# Patient Record
Sex: MALE | Race: WHITE | Employment: FULL TIME | URBAN - METROPOLITAN AREA
[De-identification: names, ages, dates, MRNs, and addresses within clinical notes are randomized per-mention and may not be internally consistent; named-entity substitution may affect disease eponyms.]

---

## 2021-11-02 NOTE — PROGRESS NOTES
Chief Complaint   Patient presents with    Diabetes    New Patient    Medication Refill       Patient was last seen: New Patient Visit  Seen by me at last practice 8/21    General:   Is too long to drive to get here, but having issues and does not see Dr Estrellita Hernandez for month and they would not let him see the NP    Had 2 weeks of very low sugars - 46s  Felt shaky  Having to eat extra meal at night to keep it up  Then stopped happening  No vaccine or Abx  But did put joana on outside of arm  - so may be cause of low readings  B/c once switched arms and back to normal    Daughter in [place]      A1c: last a1c was 7.5     DM Medications:   Metformin 1g BID  Farxiga 10mg every day  glimeperide prn (cereal in AM)    Key Antihyperglycemic Medications             glimepiride (AMARYL) 4 mg tablet (Taking)     dapagliflozin (Farxiga) 5 mg tab tablet (Taking) Take  by mouth daily. metFORMIN (GLUCOPHAGE) 1,000 mg tablet (Taking) Take 1,000 mg by mouth two (2) times daily (with meals).           Last Changes: no recent changes    Sugar Checks: checks more than 4 times a day and uses [email or web address] CGM     AM: see scanned CGM reports     PM: see scanned CGM reports     LOWs:  denies low sugars     DIET: feels does well with diabetic diet     EXERCISE: engages in activity, several times a week     HTN: at goal, on ACE-I     LIPIDS: at goal, on statin, on fenofibrate, on Rx Fish Oil, lipids followed by PCP    RENAL: has normal renal function, has normal JUAN F-r in past year     EYES: eye exam in past year, has no retinopathy     FEET: has no current issues     DENTAL:  has seen dentist in past year     HEART:  no chest pain, shortness of breath or claudication, has no cardiac history, prior studies include:, normal stress test     ASA:  is on blood thinner     SYMPTOMS: no polyuria, thirst or blurred vision     THYROID: no known thyroid issue    DIABETES HISTORY:  Dx summer '09  Stopped Alfaro and GLLP-1 after wt loss and lows early '19  H/o tradjenta -not clear why stopped      LABS/STUDIES:  8/23/21 a1c 7.5, GFR > 60    Current Outpatient Medications   Medication Sig    FreeStyle Bear 14 Day Sensor kit     glimepiride (AMARYL) 4 mg tablet     dapagliflozin (Farxiga) 5 mg tab tablet Take  by mouth daily.  warfarin (COUMADIN) 2.5 mg tablet Take 2.5 mg by mouth daily.  metFORMIN (GLUCOPHAGE) 1,000 mg tablet Take 1,000 mg by mouth two (2) times daily (with meals). No current facility-administered medications for this visit. Past Medical History:   Diagnosis Date    Diabetes ([facility]     Factor 5 Leiden mutation, heterozygous ([facility]     Hypertension         History reviewed. No pertinent surgical history. Social History     Tobacco Use    Smoking status: Never Smoker    Smokeless tobacco: Never Used   Substance Use Topics    Alcohol use: Yes     Alcohol/week: 1.0 standard drink     Types: 1 Glasses of wine per week     Comment: occ      Employer:  [employer]     Family History   Problem Relation Age of Onset    Cancer Mother     Pneumonia Father         Blood pressure 109/62, pulse (!) 54, resp. rate 16, height 6' (1.829 m), weight 229 lb 1.6 oz (103.9 kg), SpO2 98 %. Wt 8/23/12 224    Weight Metrics 11/3/2021 7/8/2016   Weight 229 lb 1.6 oz 228 lb   BMI 31.07 kg/m2 29.11 kg/m2        EXAM      Assessment/Plan:   1. Type 2 diabetes mellitus with hyperglycemia, without long-term current use of insulin (HCC)  Stable until recent 2 weeks of lows - resolved with new sensor -but was having symptoms with the lows so could be real  No clear cause, maybe fighting a virus w/o symptoms  In future, do finger stick to compare (noting 15min time difference)  Can lower metformin to 1/2 BID (or 1/2 farxiga but fell this one less likely to cause lows)    2. Primary hypertension  On meds per PCP    3.  Mixed hyperlipidemia  At goal on meds except high Tg and low HDL        Orders Placed This Encounter    [facility] CONTINUOUS GLUCOSE MONITORING ANALYSIS I&R      Follow-up and Dispositions    · Return if symptoms worsen or fail to improve.      Pt plans to see Dr Bria Nguyen in a few months at [facility]

## 2021-11-03 ENCOUNTER — OFFICE VISIT (OUTPATIENT)
Dept: ENDOCRINOLOGY | Age: 76
End: 2021-11-03
Payer: MEDICARE

## 2021-11-03 VITALS
BODY MASS INDEX: 31.03 KG/M2 | SYSTOLIC BLOOD PRESSURE: 109 MMHG | RESPIRATION RATE: 16 BRPM | DIASTOLIC BLOOD PRESSURE: 62 MMHG | WEIGHT: 229.1 LBS | HEART RATE: 54 BPM | HEIGHT: 72 IN | OXYGEN SATURATION: 98 %

## 2021-11-03 DIAGNOSIS — E11.65 TYPE 2 DIABETES MELLITUS WITH HYPERGLYCEMIA, WITHOUT LONG-TERM CURRENT USE OF INSULIN (HCC): Primary | ICD-10-CM

## 2021-11-03 DIAGNOSIS — E78.2 MIXED HYPERLIPIDEMIA: ICD-10-CM

## 2021-11-03 DIAGNOSIS — I10 PRIMARY HYPERTENSION: ICD-10-CM

## 2021-11-03 PROCEDURE — 95251 CONT GLUC MNTR ANALYSIS I&R: CPT | Performed by: INTERNAL MEDICINE

## 2021-11-03 PROCEDURE — 99214 OFFICE O/P EST MOD 30 MIN: CPT | Performed by: INTERNAL MEDICINE

## 2021-11-03 RX ORDER — FLASH GLUCOSE SENSOR
KIT MISCELLANEOUS
COMMUNITY
Start: 2021-10-30

## 2021-11-03 RX ORDER — GLIMEPIRIDE 4 MG/1
TABLET ORAL
COMMUNITY
Start: 2021-10-07 | End: 2021-11-23

## 2021-11-03 NOTE — LETTER
11/3/2021 Patient: Danielle Temple YOB: 1945 Date of Visit: 11/3/2021 Elizabeth Hollingsworth MD 
[address] [place] [address] [phone] Via Fax: [phone] Dear Elizabeth Hollingsworth MD, Thank you for referring Mr. Marino Vargas to [facility] for evaluation. My notes for this consultation are attached. If you have questions, please do not hesitate to call me. I look forward to following your patient along with you. Sincerely, Shawn Dawkins MD

## 2021-11-23 RX ORDER — GLIMEPIRIDE 4 MG/1
TABLET ORAL
Qty: 60 TABLET | Refills: 6 | Status: SHIPPED | OUTPATIENT
Start: 2021-11-23

## 2023-10-04 ENCOUNTER — HOSPITAL ENCOUNTER (OUTPATIENT)
Facility: HOSPITAL | Age: 78
Discharge: HOME | End: 2023-10-07

## 2023-10-04 VITALS
WEIGHT: 206 LBS | SYSTOLIC BLOOD PRESSURE: 120 MMHG | HEART RATE: 61 BPM | BODY MASS INDEX: 27.9 KG/M2 | HEIGHT: 72 IN | DIASTOLIC BLOOD PRESSURE: 58 MMHG

## 2023-10-04 RX ORDER — ROSUVASTATIN CALCIUM 20 MG/1
TABLET, COATED ORAL
COMMUNITY
Start: 2023-09-06

## 2023-10-04 RX ORDER — OMEGA-3S/DHA/EPA/FISH OIL 1000-1400
CAPSULE,DELAYED RELEASE (ENTERIC COATED) ORAL DAILY
COMMUNITY

## 2023-10-04 RX ORDER — ASPIRIN 81 MG/1
81 TABLET, CHEWABLE ORAL DAILY
COMMUNITY

## 2023-10-04 RX ORDER — RELUGOLIX 120 MG/1
TABLET, FILM COATED ORAL
COMMUNITY
Start: 2023-09-19

## 2023-10-04 RX ORDER — WARFARIN SODIUM 2.5 MG/1
TABLET ORAL
COMMUNITY
Start: 2023-08-22

## 2023-10-04 RX ORDER — ACETAMINOPHEN 160 MG
TABLET,DISINTEGRATING ORAL DAILY
COMMUNITY

## 2023-10-04 RX ORDER — TRANDOLAPRIL TABLETS 2 MG/1
TABLET ORAL
COMMUNITY
Start: 2023-09-06

## 2023-10-04 RX ORDER — ATENOLOL 25 MG/1
TABLET ORAL
COMMUNITY
Start: 2023-09-06

## 2023-10-04 RX ORDER — FENOFIBRIC ACID 135 MG/1
CAPSULE, DELAYED RELEASE ORAL
COMMUNITY
Start: 2023-09-06

## 2023-10-04 RX ORDER — GLIMEPIRIDE 4 MG/1
TABLET ORAL
COMMUNITY
Start: 2023-09-11

## 2023-10-04 RX ORDER — SEMAGLUTIDE 2.68 MG/ML
INJECTION, SOLUTION SUBCUTANEOUS
COMMUNITY
Start: 2023-08-24

## 2023-10-04 RX ORDER — DAPAGLIFLOZIN AND METFORMIN HYDROCHLORIDE 5; 1000 MG/1; MG/1
TABLET, FILM COATED, EXTENDED RELEASE ORAL
COMMUNITY
Start: 2023-08-24

## 2023-10-04 NOTE — CONSULTS
[facility] [place] at [facility]    Radiation Oncology Consultation    Shanika Mcclain III  [id number]  1945     Diagnosis   1. 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 History of Present Illness   Mr. Sarah Lazo is a 70 y.o. male seen in consultation at the request of Dr. Zeus Izquierdo to assess the role of radiation for his above diagnosis. He has a past medical history of hyperlipidemia, hypertension, diabetes mellitus, CAD, and factor V Leyden disease with a history of DVT/PE on Coumadin who was initially referred to urology in early 2023 for an elevated PSA of 7.0 on 3/1/2023. PSA was repeated on 4/21/2023 and was persistently elevated at 7.52. On 6/3/2023 he underwent a multiparametric MRI of the prostate at [facility]. His gland was measured at 54 cc. He was noted to have a 1.4 x 2.1 x 0.7 cm PI-RADS 5 lesion in the right posterior peripheral zone extending from the mid gland to the apex as well as into the left side with areas of extracapsular extension seen. No seminal vesicle invasion, neurovascular bundle involvement, or pelvic lymphadenopathy was noted. No aggressive osseous lesions were noted. On 8/15/2023 he underwent a TRUS guided template core and MR/US fusion guided targeted biopsy of the prostate. His gland was measured at 45 cc. MELANIE was normal.    Final pathology revealed 10/12 template and 1/1 targeted biopsy positive for adenocarcinoma; a total of 11/13 biopsies. Gera 4+3=7 disease was noted in 2 template cores. Cribriform carcinoma and perineural invasion were noted. [place] 3+4=7 disease in the 1 targeted and 4 template biopsies. The remaining biopsies were Gera 6. On 9/14/2023 he underwent a Pylarify PSMA PET/CT at [facility].   This showed tracer binding in the right

## 2023-12-12 ENCOUNTER — HOSPITAL ENCOUNTER (OUTPATIENT)
Age: 78
Discharge: HOME | End: 2023-12-15
Payer: MEDICARE

## 2023-12-12 ENCOUNTER — HOSPITAL ENCOUNTER (OUTPATIENT)
Facility: HOSPITAL | Age: 78
Discharge: HOME | End: 2023-12-15
Attending: RADIOLOGY

## 2023-12-12 DIAGNOSIS — C61 PROSTATE CA (HCC): ICD-10-CM

## 2023-12-12 PROCEDURE — 76498 UNLISTED MR PROCEDURE: CPT

## 2024-01-15 ENCOUNTER — HOSPITAL ENCOUNTER (OUTPATIENT)
Facility: HOSPITAL | Age: 79
Discharge: HOME | End: 2024-01-18
Attending: RADIOLOGY

## 2024-01-15 LAB
RAD ONC ARIA COURSE FIRST TREATMENT DATE: NORMAL
RAD ONC ARIA COURSE ID: NORMAL
RAD ONC ARIA COURSE INTENT: NORMAL
RAD ONC ARIA COURSE LAST TREATMENT DATE: NORMAL
RAD ONC ARIA COURSE SESSION NUMBER: 1
RAD ONC ARIA COURSE START DATE: NORMAL
RAD ONC ARIA COURSE TREATMENT ELAPSED DAYS: 0
RAD ONC ARIA PLAN FRACTIONS TREATED TO DATE: 1
RAD ONC ARIA PLAN ID: NORMAL
RAD ONC ARIA PLAN PRESCRIBED DOSE PER FRACTION: 3 GY
RAD ONC ARIA PLAN PRIMARY REFERENCE POINT: NORMAL
RAD ONC ARIA PLAN TOTAL FRACTIONS PRESCRIBED: 20
RAD ONC ARIA PLAN TOTAL PRESCRIBED DOSE: 6000 CGY
RAD ONC ARIA REFERENCE POINT DOSAGE GIVEN TO DATE: 3 GY
RAD ONC ARIA REFERENCE POINT DOSAGE GIVEN TO DATE: 3.1 GY
RAD ONC ARIA REFERENCE POINT DOSAGE GIVEN TO DATE: 3.35 GY
RAD ONC ARIA REFERENCE POINT DOSAGE GIVEN TO DATE: 3.5 GY
RAD ONC ARIA REFERENCE POINT ID: NORMAL
RAD ONC ARIA REFERENCE POINT SESSION DOSAGE GIVEN: 3 GY
RAD ONC ARIA REFERENCE POINT SESSION DOSAGE GIVEN: 3.1 GY
RAD ONC ARIA REFERENCE POINT SESSION DOSAGE GIVEN: 3.35 GY
RAD ONC ARIA REFERENCE POINT SESSION DOSAGE GIVEN: 3.5 GY

## 2024-01-16 ENCOUNTER — HOSPITAL ENCOUNTER (OUTPATIENT)
Facility: HOSPITAL | Age: 79
Discharge: HOME | End: 2024-01-19
Attending: RADIOLOGY

## 2024-01-16 LAB
RAD ONC ARIA COURSE FIRST TREATMENT DATE: NORMAL
RAD ONC ARIA COURSE ID: NORMAL
RAD ONC ARIA COURSE INTENT: NORMAL
RAD ONC ARIA COURSE LAST TREATMENT DATE: NORMAL
RAD ONC ARIA COURSE SESSION NUMBER: 2
RAD ONC ARIA COURSE START DATE: NORMAL
RAD ONC ARIA COURSE TREATMENT ELAPSED DAYS: 1
RAD ONC ARIA PLAN FRACTIONS TREATED TO DATE: 2
RAD ONC ARIA PLAN ID: NORMAL
RAD ONC ARIA PLAN PRESCRIBED DOSE PER FRACTION: 3 GY
RAD ONC ARIA PLAN PRIMARY REFERENCE POINT: NORMAL
RAD ONC ARIA PLAN TOTAL FRACTIONS PRESCRIBED: 20
RAD ONC ARIA PLAN TOTAL PRESCRIBED DOSE: 6000 CGY
RAD ONC ARIA REFERENCE POINT DOSAGE GIVEN TO DATE: 6 GY
RAD ONC ARIA REFERENCE POINT DOSAGE GIVEN TO DATE: 6.2 GY
RAD ONC ARIA REFERENCE POINT DOSAGE GIVEN TO DATE: 6.7 GY
RAD ONC ARIA REFERENCE POINT DOSAGE GIVEN TO DATE: 7 GY
RAD ONC ARIA REFERENCE POINT ID: NORMAL
RAD ONC ARIA REFERENCE POINT SESSION DOSAGE GIVEN: 3 GY
RAD ONC ARIA REFERENCE POINT SESSION DOSAGE GIVEN: 3.1 GY
RAD ONC ARIA REFERENCE POINT SESSION DOSAGE GIVEN: 3.35 GY
RAD ONC ARIA REFERENCE POINT SESSION DOSAGE GIVEN: 3.5 GY

## 2024-01-17 ENCOUNTER — HOSPITAL ENCOUNTER (OUTPATIENT)
Facility: HOSPITAL | Age: 79
Discharge: HOME | End: 2024-01-20
Attending: RADIOLOGY

## 2024-01-17 LAB
RAD ONC ARIA COURSE FIRST TREATMENT DATE: NORMAL
RAD ONC ARIA COURSE ID: NORMAL
RAD ONC ARIA COURSE INTENT: NORMAL
RAD ONC ARIA COURSE LAST TREATMENT DATE: NORMAL
RAD ONC ARIA COURSE SESSION NUMBER: 3
RAD ONC ARIA COURSE START DATE: NORMAL
RAD ONC ARIA COURSE TREATMENT ELAPSED DAYS: 2
RAD ONC ARIA PLAN FRACTIONS TREATED TO DATE: 3
RAD ONC ARIA PLAN ID: NORMAL
RAD ONC ARIA PLAN PRESCRIBED DOSE PER FRACTION: 3 GY
RAD ONC ARIA PLAN PRIMARY REFERENCE POINT: NORMAL
RAD ONC ARIA PLAN TOTAL FRACTIONS PRESCRIBED: 20
RAD ONC ARIA PLAN TOTAL PRESCRIBED DOSE: 6000 CGY
RAD ONC ARIA REFERENCE POINT DOSAGE GIVEN TO DATE: 10.05 GY
RAD ONC ARIA REFERENCE POINT DOSAGE GIVEN TO DATE: 10.5 GY
RAD ONC ARIA REFERENCE POINT DOSAGE GIVEN TO DATE: 9 GY
RAD ONC ARIA REFERENCE POINT DOSAGE GIVEN TO DATE: 9.3 GY
RAD ONC ARIA REFERENCE POINT ID: NORMAL
RAD ONC ARIA REFERENCE POINT SESSION DOSAGE GIVEN: 3 GY
RAD ONC ARIA REFERENCE POINT SESSION DOSAGE GIVEN: 3.1 GY
RAD ONC ARIA REFERENCE POINT SESSION DOSAGE GIVEN: 3.35 GY
RAD ONC ARIA REFERENCE POINT SESSION DOSAGE GIVEN: 3.5 GY

## 2024-01-18 ENCOUNTER — HOSPITAL ENCOUNTER (OUTPATIENT)
Facility: HOSPITAL | Age: 79
Discharge: HOME | End: 2024-01-21
Attending: RADIOLOGY

## 2024-01-18 LAB
RAD ONC ARIA COURSE FIRST TREATMENT DATE: NORMAL
RAD ONC ARIA COURSE ID: NORMAL
RAD ONC ARIA COURSE INTENT: NORMAL
RAD ONC ARIA COURSE LAST TREATMENT DATE: NORMAL
RAD ONC ARIA COURSE SESSION NUMBER: 4
RAD ONC ARIA COURSE START DATE: NORMAL
RAD ONC ARIA COURSE TREATMENT ELAPSED DAYS: 3
RAD ONC ARIA PLAN FRACTIONS TREATED TO DATE: 4
RAD ONC ARIA PLAN ID: NORMAL
RAD ONC ARIA PLAN PRESCRIBED DOSE PER FRACTION: 3 GY
RAD ONC ARIA PLAN PRIMARY REFERENCE POINT: NORMAL
RAD ONC ARIA PLAN TOTAL FRACTIONS PRESCRIBED: 20
RAD ONC ARIA PLAN TOTAL PRESCRIBED DOSE: 6000 CGY
RAD ONC ARIA REFERENCE POINT DOSAGE GIVEN TO DATE: 12 GY
RAD ONC ARIA REFERENCE POINT DOSAGE GIVEN TO DATE: 12.4 GY
RAD ONC ARIA REFERENCE POINT DOSAGE GIVEN TO DATE: 13.4 GY
RAD ONC ARIA REFERENCE POINT DOSAGE GIVEN TO DATE: 14 GY
RAD ONC ARIA REFERENCE POINT ID: NORMAL
RAD ONC ARIA REFERENCE POINT SESSION DOSAGE GIVEN: 3 GY
RAD ONC ARIA REFERENCE POINT SESSION DOSAGE GIVEN: 3.1 GY
RAD ONC ARIA REFERENCE POINT SESSION DOSAGE GIVEN: 3.35 GY
RAD ONC ARIA REFERENCE POINT SESSION DOSAGE GIVEN: 3.5 GY

## 2024-01-19 ENCOUNTER — HOSPITAL ENCOUNTER (OUTPATIENT)
Facility: HOSPITAL | Age: 79
Discharge: HOME | End: 2024-01-22
Attending: RADIOLOGY

## 2024-01-19 LAB
RAD ONC ARIA COURSE FIRST TREATMENT DATE: NORMAL
RAD ONC ARIA COURSE ID: NORMAL
RAD ONC ARIA COURSE INTENT: NORMAL
RAD ONC ARIA COURSE LAST TREATMENT DATE: NORMAL
RAD ONC ARIA COURSE SESSION NUMBER: 5
RAD ONC ARIA COURSE START DATE: NORMAL
RAD ONC ARIA COURSE TREATMENT ELAPSED DAYS: 4
RAD ONC ARIA PLAN FRACTIONS TREATED TO DATE: 5
RAD ONC ARIA PLAN ID: NORMAL
RAD ONC ARIA PLAN PRESCRIBED DOSE PER FRACTION: 3 GY
RAD ONC ARIA PLAN PRIMARY REFERENCE POINT: NORMAL
RAD ONC ARIA PLAN TOTAL FRACTIONS PRESCRIBED: 20
RAD ONC ARIA PLAN TOTAL PRESCRIBED DOSE: 6000 CGY
RAD ONC ARIA REFERENCE POINT DOSAGE GIVEN TO DATE: 15 GY
RAD ONC ARIA REFERENCE POINT DOSAGE GIVEN TO DATE: 15.5 GY
RAD ONC ARIA REFERENCE POINT DOSAGE GIVEN TO DATE: 16.75 GY
RAD ONC ARIA REFERENCE POINT DOSAGE GIVEN TO DATE: 17.5 GY
RAD ONC ARIA REFERENCE POINT ID: NORMAL
RAD ONC ARIA REFERENCE POINT SESSION DOSAGE GIVEN: 3 GY
RAD ONC ARIA REFERENCE POINT SESSION DOSAGE GIVEN: 3.1 GY
RAD ONC ARIA REFERENCE POINT SESSION DOSAGE GIVEN: 3.35 GY
RAD ONC ARIA REFERENCE POINT SESSION DOSAGE GIVEN: 3.5 GY

## 2024-01-19 ASSESSMENT — PAIN SCALES - GENERAL: PAINLEVEL_OUTOF10: 0

## 2024-01-19 NOTE — PROGRESS NOTES
[facility] [place]  [facility]    Radiation Oncology Weekly Progress Note  Encounter Date: 01/19/24     iRchie Potter III  [id number]  1945     Diagnosis   NCCN high risk prostate adenocarcinoma, clinical T3a (EPE noted on MRI) N0 M0, initial PSA 7.08, [place] 4+3=7 (2 out of total 11/13 biopsies +, + cribriform pattern, + PNI)       AJCC Staging has been reviewed.    Interval History   Mr. Potter is a 78 y.o. male seen today for his weekly on-treatment evaluation    1/19/2024: Doing very well thus far after 5 fractions.  No  or GI complaints.  Having hot flashes.  Informed me that his Orgovyx was over $1000 per month and thus we will switch to Lupron.  Discussed a total of 2 years of ADT.    Review of Systems:  A complete review of systems was obtained and negative except as described above.    Treatment Details:     Treatment Site Dose/Fx (cGy) #Fx Current Dose (cGy) Total Planned Dose (cGy) Start Date Most Recent Treatment Date   Prostate 300  5/20 1500 6000 with SIB to MRI disease 1/15/24 01/19/24     Concurrent Therapy: Concurrent ADT:      Allergies and Medications     Allergies   Allergen Reactions    Sulfa Antibiotics        Current Outpatient Medications   Medication Instructions    Apoaequorin (PREVAGEN EXTRA STRENGTH PO) 1 tablet, Oral, DAILY    aspirin 81 mg, Oral, DAILY    atenolol (TENORMIN) 25 MG tablet No dose, route, or frequency recorded.    Cholecalciferol (VITAMIN D3) 50 MCG (2000 UT) CAPS Oral, DAILY    fenofibric acid (TRILIPIX) 135 MG CPDR capsule No dose, route, or frequency recorded.    glimepiride (AMARYL) 4 MG tablet No dose, route, or frequency recorded.    Multiple Vitamins-Minerals (MULTIVITAMIN ADULTS PO) Oral, DAILY    Omega-3 Fatty Acids (FISH OIL ULTRA) 1400 MG CAPS Oral, DAILY    ORGOVYX 120 MG TABS No dose, route, or frequency recorded.    OZEMPIC, 2 MG/DOSE, 8 MG/3ML SOPN No dose, route, or frequency recorded.    RESVERATROL  mg, Oral, DAILY

## 2024-01-22 ENCOUNTER — HOSPITAL ENCOUNTER (OUTPATIENT)
Facility: HOSPITAL | Age: 79
Discharge: HOME | End: 2024-01-25
Attending: RADIOLOGY

## 2024-01-22 LAB
RAD ONC ARIA COURSE FIRST TREATMENT DATE: NORMAL
RAD ONC ARIA COURSE ID: NORMAL
RAD ONC ARIA COURSE INTENT: NORMAL
RAD ONC ARIA COURSE LAST TREATMENT DATE: NORMAL
RAD ONC ARIA COURSE SESSION NUMBER: 6
RAD ONC ARIA COURSE START DATE: NORMAL
RAD ONC ARIA COURSE TREATMENT ELAPSED DAYS: 7
RAD ONC ARIA PLAN FRACTIONS TREATED TO DATE: 6
RAD ONC ARIA PLAN ID: NORMAL
RAD ONC ARIA PLAN PRESCRIBED DOSE PER FRACTION: 3 GY
RAD ONC ARIA PLAN PRIMARY REFERENCE POINT: NORMAL
RAD ONC ARIA PLAN TOTAL FRACTIONS PRESCRIBED: 20
RAD ONC ARIA PLAN TOTAL PRESCRIBED DOSE: 6000 CGY
RAD ONC ARIA REFERENCE POINT DOSAGE GIVEN TO DATE: 18 GY
RAD ONC ARIA REFERENCE POINT DOSAGE GIVEN TO DATE: 18.6 GY
RAD ONC ARIA REFERENCE POINT DOSAGE GIVEN TO DATE: 20.1 GY
RAD ONC ARIA REFERENCE POINT DOSAGE GIVEN TO DATE: 21 GY
RAD ONC ARIA REFERENCE POINT ID: NORMAL
RAD ONC ARIA REFERENCE POINT SESSION DOSAGE GIVEN: 3 GY
RAD ONC ARIA REFERENCE POINT SESSION DOSAGE GIVEN: 3.1 GY
RAD ONC ARIA REFERENCE POINT SESSION DOSAGE GIVEN: 3.35 GY
RAD ONC ARIA REFERENCE POINT SESSION DOSAGE GIVEN: 3.5 GY

## 2024-01-23 ENCOUNTER — HOSPITAL ENCOUNTER (OUTPATIENT)
Facility: HOSPITAL | Age: 79
Discharge: HOME | End: 2024-01-26
Attending: RADIOLOGY

## 2024-01-23 LAB
RAD ONC ARIA COURSE FIRST TREATMENT DATE: NORMAL
RAD ONC ARIA COURSE ID: NORMAL
RAD ONC ARIA COURSE INTENT: NORMAL
RAD ONC ARIA COURSE LAST TREATMENT DATE: NORMAL
RAD ONC ARIA COURSE SESSION NUMBER: 7
RAD ONC ARIA COURSE START DATE: NORMAL
RAD ONC ARIA COURSE TREATMENT ELAPSED DAYS: 8
RAD ONC ARIA PLAN FRACTIONS TREATED TO DATE: 7
RAD ONC ARIA PLAN ID: NORMAL
RAD ONC ARIA PLAN PRESCRIBED DOSE PER FRACTION: 3 GY
RAD ONC ARIA PLAN PRIMARY REFERENCE POINT: NORMAL
RAD ONC ARIA PLAN TOTAL FRACTIONS PRESCRIBED: 20
RAD ONC ARIA PLAN TOTAL PRESCRIBED DOSE: 6000 CGY
RAD ONC ARIA REFERENCE POINT DOSAGE GIVEN TO DATE: 21 GY
RAD ONC ARIA REFERENCE POINT DOSAGE GIVEN TO DATE: 21.7 GY
RAD ONC ARIA REFERENCE POINT DOSAGE GIVEN TO DATE: 23.45 GY
RAD ONC ARIA REFERENCE POINT DOSAGE GIVEN TO DATE: 24.5 GY
RAD ONC ARIA REFERENCE POINT ID: NORMAL
RAD ONC ARIA REFERENCE POINT SESSION DOSAGE GIVEN: 3 GY
RAD ONC ARIA REFERENCE POINT SESSION DOSAGE GIVEN: 3.1 GY
RAD ONC ARIA REFERENCE POINT SESSION DOSAGE GIVEN: 3.35 GY
RAD ONC ARIA REFERENCE POINT SESSION DOSAGE GIVEN: 3.5 GY

## 2024-01-24 ENCOUNTER — HOSPITAL ENCOUNTER (OUTPATIENT)
Facility: HOSPITAL | Age: 79
Discharge: HOME | End: 2024-01-27
Attending: RADIOLOGY

## 2024-01-24 LAB
RAD ONC ARIA COURSE FIRST TREATMENT DATE: NORMAL
RAD ONC ARIA COURSE ID: NORMAL
RAD ONC ARIA COURSE INTENT: NORMAL
RAD ONC ARIA COURSE LAST TREATMENT DATE: NORMAL
RAD ONC ARIA COURSE SESSION NUMBER: 8
RAD ONC ARIA COURSE START DATE: NORMAL
RAD ONC ARIA COURSE TREATMENT ELAPSED DAYS: 9
RAD ONC ARIA PLAN FRACTIONS TREATED TO DATE: 8
RAD ONC ARIA PLAN ID: NORMAL
RAD ONC ARIA PLAN PRESCRIBED DOSE PER FRACTION: 3 GY
RAD ONC ARIA PLAN PRIMARY REFERENCE POINT: NORMAL
RAD ONC ARIA PLAN TOTAL FRACTIONS PRESCRIBED: 20
RAD ONC ARIA PLAN TOTAL PRESCRIBED DOSE: 6000 CGY
RAD ONC ARIA REFERENCE POINT DOSAGE GIVEN TO DATE: 24 GY
RAD ONC ARIA REFERENCE POINT DOSAGE GIVEN TO DATE: 24.8 GY
RAD ONC ARIA REFERENCE POINT DOSAGE GIVEN TO DATE: 26.8 GY
RAD ONC ARIA REFERENCE POINT DOSAGE GIVEN TO DATE: 28 GY
RAD ONC ARIA REFERENCE POINT ID: NORMAL
RAD ONC ARIA REFERENCE POINT SESSION DOSAGE GIVEN: 3 GY
RAD ONC ARIA REFERENCE POINT SESSION DOSAGE GIVEN: 3.1 GY
RAD ONC ARIA REFERENCE POINT SESSION DOSAGE GIVEN: 3.35 GY
RAD ONC ARIA REFERENCE POINT SESSION DOSAGE GIVEN: 3.5 GY

## 2024-01-25 ENCOUNTER — HOSPITAL ENCOUNTER (OUTPATIENT)
Facility: HOSPITAL | Age: 79
Discharge: HOME | End: 2024-01-28
Attending: RADIOLOGY

## 2024-01-25 LAB
RAD ONC ARIA COURSE FIRST TREATMENT DATE: NORMAL
RAD ONC ARIA COURSE ID: NORMAL
RAD ONC ARIA COURSE INTENT: NORMAL
RAD ONC ARIA COURSE LAST TREATMENT DATE: NORMAL
RAD ONC ARIA COURSE SESSION NUMBER: 9
RAD ONC ARIA COURSE START DATE: NORMAL
RAD ONC ARIA COURSE TREATMENT ELAPSED DAYS: 10
RAD ONC ARIA PLAN FRACTIONS TREATED TO DATE: 9
RAD ONC ARIA PLAN ID: NORMAL
RAD ONC ARIA PLAN PRESCRIBED DOSE PER FRACTION: 3 GY
RAD ONC ARIA PLAN PRIMARY REFERENCE POINT: NORMAL
RAD ONC ARIA PLAN TOTAL FRACTIONS PRESCRIBED: 20
RAD ONC ARIA PLAN TOTAL PRESCRIBED DOSE: 6000 CGY
RAD ONC ARIA REFERENCE POINT DOSAGE GIVEN TO DATE: 27 GY
RAD ONC ARIA REFERENCE POINT DOSAGE GIVEN TO DATE: 27.9 GY
RAD ONC ARIA REFERENCE POINT DOSAGE GIVEN TO DATE: 30.15 GY
RAD ONC ARIA REFERENCE POINT DOSAGE GIVEN TO DATE: 31.5 GY
RAD ONC ARIA REFERENCE POINT ID: NORMAL
RAD ONC ARIA REFERENCE POINT SESSION DOSAGE GIVEN: 3 GY
RAD ONC ARIA REFERENCE POINT SESSION DOSAGE GIVEN: 3.1 GY
RAD ONC ARIA REFERENCE POINT SESSION DOSAGE GIVEN: 3.35 GY
RAD ONC ARIA REFERENCE POINT SESSION DOSAGE GIVEN: 3.5 GY

## 2024-01-26 ENCOUNTER — HOSPITAL ENCOUNTER (OUTPATIENT)
Facility: HOSPITAL | Age: 79
Discharge: HOME | End: 2024-01-29
Attending: RADIOLOGY

## 2024-01-26 LAB
RAD ONC ARIA COURSE FIRST TREATMENT DATE: NORMAL
RAD ONC ARIA COURSE ID: NORMAL
RAD ONC ARIA COURSE INTENT: NORMAL
RAD ONC ARIA COURSE LAST TREATMENT DATE: NORMAL
RAD ONC ARIA COURSE SESSION NUMBER: 10
RAD ONC ARIA COURSE START DATE: NORMAL
RAD ONC ARIA COURSE TREATMENT ELAPSED DAYS: 11
RAD ONC ARIA PLAN FRACTIONS TREATED TO DATE: 10
RAD ONC ARIA PLAN ID: NORMAL
RAD ONC ARIA PLAN PRESCRIBED DOSE PER FRACTION: 3 GY
RAD ONC ARIA PLAN PRIMARY REFERENCE POINT: NORMAL
RAD ONC ARIA PLAN TOTAL FRACTIONS PRESCRIBED: 20
RAD ONC ARIA PLAN TOTAL PRESCRIBED DOSE: 6000 CGY
RAD ONC ARIA REFERENCE POINT DOSAGE GIVEN TO DATE: 30 GY
RAD ONC ARIA REFERENCE POINT DOSAGE GIVEN TO DATE: 31 GY
RAD ONC ARIA REFERENCE POINT DOSAGE GIVEN TO DATE: 33.5 GY
RAD ONC ARIA REFERENCE POINT DOSAGE GIVEN TO DATE: 35 GY
RAD ONC ARIA REFERENCE POINT ID: NORMAL
RAD ONC ARIA REFERENCE POINT SESSION DOSAGE GIVEN: 3 GY
RAD ONC ARIA REFERENCE POINT SESSION DOSAGE GIVEN: 3.1 GY
RAD ONC ARIA REFERENCE POINT SESSION DOSAGE GIVEN: 3.35 GY
RAD ONC ARIA REFERENCE POINT SESSION DOSAGE GIVEN: 3.5 GY

## 2024-01-26 ASSESSMENT — PAIN SCALES - GENERAL: PAINLEVEL_OUTOF10: 0

## 2024-01-26 NOTE — PROGRESS NOTES
[facility] [place]  [facility]    Radiation Oncology Weekly Progress Note  Encounter Date: 01/26/24     Richie Potter III  [id number]  1945     Diagnosis   NCCN high risk prostate adenocarcinoma, clinical T3a (EPE noted on MRI) N0 M0, initial PSA 7.08, [place] 4+3=7 (2 out of total 11/13 biopsies +, + cribriform pattern, + PNI)       AJCC Staging has been reviewed.    Interval History   Mr. Potter is a 78 y.o. male seen today for his weekly on-treatment evaluation    1/19/2024: Doing very well thus far after 5 fractions.  No  or GI complaints.  Having hot flashes.  Informed me that his Orgovyx was over $1000 per month and thus we will switch to Lupron.  Discussed a total of 2 years of ADT.  1/26/2024: Flomax was prescribed last week to help with urination and has significantly improved his urinary symptoms.  Stream and nocturia as well as slight dysuria have all improved.  No dizziness.  No GI complaints or changes to bowel habits.    Review of Systems:  A complete review of systems was obtained and negative except as described above.    Treatment Details:     Treatment Site Dose/Fx (cGy) #Fx Current Dose (cGy) Total Planned Dose (cGy) Start Date Most Recent Treatment Date   Prostate 300  10/20 3100 6000 with SIB to MRI disease 1/15/24 01/26/24     Concurrent Therapy: Concurrent ADT:      Allergies and Medications     Allergies   Allergen Reactions    Sulfa Antibiotics        Current Outpatient Medications   Medication Instructions    Apoaequorin (PREVAGEN EXTRA STRENGTH PO) 1 tablet, Oral, DAILY    aspirin 81 mg, Oral, DAILY    atenolol (TENORMIN) 25 MG tablet No dose, route, or frequency recorded.    Cholecalciferol (VITAMIN D3) 50 MCG (2000 UT) CAPS Oral, DAILY    fenofibric acid (TRILIPIX) 135 MG CPDR capsule No dose, route, or frequency recorded.    glimepiride (AMARYL) 4 MG tablet No dose, route, or frequency recorded.    Multiple Vitamins-Minerals (MULTIVITAMIN ADULTS PO) Oral, DAILY

## 2024-01-29 ENCOUNTER — HOSPITAL ENCOUNTER (OUTPATIENT)
Facility: HOSPITAL | Age: 79
Discharge: HOME | End: 2024-02-01
Attending: RADIOLOGY

## 2024-01-29 LAB
RAD ONC ARIA COURSE FIRST TREATMENT DATE: NORMAL
RAD ONC ARIA COURSE ID: NORMAL
RAD ONC ARIA COURSE INTENT: NORMAL
RAD ONC ARIA COURSE LAST TREATMENT DATE: NORMAL
RAD ONC ARIA COURSE SESSION NUMBER: 11
RAD ONC ARIA COURSE START DATE: NORMAL
RAD ONC ARIA COURSE TREATMENT ELAPSED DAYS: 14
RAD ONC ARIA PLAN FRACTIONS TREATED TO DATE: 11
RAD ONC ARIA PLAN ID: NORMAL
RAD ONC ARIA PLAN PRESCRIBED DOSE PER FRACTION: 3 GY
RAD ONC ARIA PLAN PRIMARY REFERENCE POINT: NORMAL
RAD ONC ARIA PLAN TOTAL FRACTIONS PRESCRIBED: 20
RAD ONC ARIA PLAN TOTAL PRESCRIBED DOSE: 6000 CGY
RAD ONC ARIA REFERENCE POINT DOSAGE GIVEN TO DATE: 33 GY
RAD ONC ARIA REFERENCE POINT DOSAGE GIVEN TO DATE: 34.1 GY
RAD ONC ARIA REFERENCE POINT DOSAGE GIVEN TO DATE: 36.84 GY
RAD ONC ARIA REFERENCE POINT DOSAGE GIVEN TO DATE: 38.5 GY
RAD ONC ARIA REFERENCE POINT ID: NORMAL
RAD ONC ARIA REFERENCE POINT SESSION DOSAGE GIVEN: 3 GY
RAD ONC ARIA REFERENCE POINT SESSION DOSAGE GIVEN: 3.1 GY
RAD ONC ARIA REFERENCE POINT SESSION DOSAGE GIVEN: 3.35 GY
RAD ONC ARIA REFERENCE POINT SESSION DOSAGE GIVEN: 3.5 GY

## 2024-01-30 ENCOUNTER — HOSPITAL ENCOUNTER (OUTPATIENT)
Facility: HOSPITAL | Age: 79
Discharge: HOME | End: 2024-02-02
Attending: RADIOLOGY

## 2024-01-30 LAB
RAD ONC ARIA COURSE FIRST TREATMENT DATE: NORMAL
RAD ONC ARIA COURSE ID: NORMAL
RAD ONC ARIA COURSE INTENT: NORMAL
RAD ONC ARIA COURSE LAST TREATMENT DATE: NORMAL
RAD ONC ARIA COURSE SESSION NUMBER: 12
RAD ONC ARIA COURSE START DATE: NORMAL
RAD ONC ARIA COURSE TREATMENT ELAPSED DAYS: 15
RAD ONC ARIA PLAN FRACTIONS TREATED TO DATE: 12
RAD ONC ARIA PLAN ID: NORMAL
RAD ONC ARIA PLAN PRESCRIBED DOSE PER FRACTION: 3 GY
RAD ONC ARIA PLAN PRIMARY REFERENCE POINT: NORMAL
RAD ONC ARIA PLAN TOTAL FRACTIONS PRESCRIBED: 20
RAD ONC ARIA PLAN TOTAL PRESCRIBED DOSE: 6000 CGY
RAD ONC ARIA REFERENCE POINT DOSAGE GIVEN TO DATE: 36 GY
RAD ONC ARIA REFERENCE POINT DOSAGE GIVEN TO DATE: 37.2 GY
RAD ONC ARIA REFERENCE POINT DOSAGE GIVEN TO DATE: 40.19 GY
RAD ONC ARIA REFERENCE POINT DOSAGE GIVEN TO DATE: 42 GY
RAD ONC ARIA REFERENCE POINT ID: NORMAL
RAD ONC ARIA REFERENCE POINT SESSION DOSAGE GIVEN: 3 GY
RAD ONC ARIA REFERENCE POINT SESSION DOSAGE GIVEN: 3.1 GY
RAD ONC ARIA REFERENCE POINT SESSION DOSAGE GIVEN: 3.35 GY
RAD ONC ARIA REFERENCE POINT SESSION DOSAGE GIVEN: 3.5 GY

## 2024-01-31 ENCOUNTER — HOSPITAL ENCOUNTER (OUTPATIENT)
Facility: HOSPITAL | Age: 79
Discharge: HOME | End: 2024-02-03
Attending: RADIOLOGY

## 2024-01-31 LAB
RAD ONC ARIA COURSE FIRST TREATMENT DATE: NORMAL
RAD ONC ARIA COURSE ID: NORMAL
RAD ONC ARIA COURSE INTENT: NORMAL
RAD ONC ARIA COURSE LAST TREATMENT DATE: NORMAL
RAD ONC ARIA COURSE SESSION NUMBER: 13
RAD ONC ARIA COURSE START DATE: NORMAL
RAD ONC ARIA COURSE TREATMENT ELAPSED DAYS: 16
RAD ONC ARIA PLAN FRACTIONS TREATED TO DATE: 13
RAD ONC ARIA PLAN ID: NORMAL
RAD ONC ARIA PLAN PRESCRIBED DOSE PER FRACTION: 3 GY
RAD ONC ARIA PLAN PRIMARY REFERENCE POINT: NORMAL
RAD ONC ARIA PLAN TOTAL FRACTIONS PRESCRIBED: 20
RAD ONC ARIA PLAN TOTAL PRESCRIBED DOSE: 6000 CGY
RAD ONC ARIA REFERENCE POINT DOSAGE GIVEN TO DATE: 39 GY
RAD ONC ARIA REFERENCE POINT DOSAGE GIVEN TO DATE: 40.3 GY
RAD ONC ARIA REFERENCE POINT DOSAGE GIVEN TO DATE: 43.54 GY
RAD ONC ARIA REFERENCE POINT DOSAGE GIVEN TO DATE: 45.5 GY
RAD ONC ARIA REFERENCE POINT ID: NORMAL
RAD ONC ARIA REFERENCE POINT SESSION DOSAGE GIVEN: 3 GY
RAD ONC ARIA REFERENCE POINT SESSION DOSAGE GIVEN: 3.1 GY
RAD ONC ARIA REFERENCE POINT SESSION DOSAGE GIVEN: 3.35 GY
RAD ONC ARIA REFERENCE POINT SESSION DOSAGE GIVEN: 3.5 GY

## 2024-02-01 ENCOUNTER — HOSPITAL ENCOUNTER (OUTPATIENT)
Facility: HOSPITAL | Age: 79
Discharge: HOME | End: 2024-02-04
Attending: RADIOLOGY

## 2024-02-01 LAB
RAD ONC ARIA COURSE FIRST TREATMENT DATE: NORMAL
RAD ONC ARIA COURSE ID: NORMAL
RAD ONC ARIA COURSE INTENT: NORMAL
RAD ONC ARIA COURSE LAST TREATMENT DATE: NORMAL
RAD ONC ARIA COURSE SESSION NUMBER: 14
RAD ONC ARIA COURSE START DATE: NORMAL
RAD ONC ARIA COURSE TREATMENT ELAPSED DAYS: 17
RAD ONC ARIA PLAN FRACTIONS TREATED TO DATE: 14
RAD ONC ARIA PLAN ID: NORMAL
RAD ONC ARIA PLAN PRESCRIBED DOSE PER FRACTION: 3 GY
RAD ONC ARIA PLAN PRIMARY REFERENCE POINT: NORMAL
RAD ONC ARIA PLAN TOTAL FRACTIONS PRESCRIBED: 20
RAD ONC ARIA PLAN TOTAL PRESCRIBED DOSE: 6000 CGY
RAD ONC ARIA REFERENCE POINT DOSAGE GIVEN TO DATE: 42 GY
RAD ONC ARIA REFERENCE POINT DOSAGE GIVEN TO DATE: 43.4 GY
RAD ONC ARIA REFERENCE POINT DOSAGE GIVEN TO DATE: 46.89 GY
RAD ONC ARIA REFERENCE POINT DOSAGE GIVEN TO DATE: 49 GY
RAD ONC ARIA REFERENCE POINT ID: NORMAL
RAD ONC ARIA REFERENCE POINT SESSION DOSAGE GIVEN: 3 GY
RAD ONC ARIA REFERENCE POINT SESSION DOSAGE GIVEN: 3.1 GY
RAD ONC ARIA REFERENCE POINT SESSION DOSAGE GIVEN: 3.35 GY
RAD ONC ARIA REFERENCE POINT SESSION DOSAGE GIVEN: 3.5 GY

## 2024-02-02 ENCOUNTER — HOSPITAL ENCOUNTER (OUTPATIENT)
Facility: HOSPITAL | Age: 79
Discharge: HOME | End: 2024-02-05
Attending: RADIOLOGY

## 2024-02-02 DIAGNOSIS — C61 PROSTATE CANCER (HCC): Primary | ICD-10-CM

## 2024-02-02 LAB
RAD ONC ARIA COURSE FIRST TREATMENT DATE: NORMAL
RAD ONC ARIA COURSE ID: NORMAL
RAD ONC ARIA COURSE INTENT: NORMAL
RAD ONC ARIA COURSE LAST TREATMENT DATE: NORMAL
RAD ONC ARIA COURSE SESSION NUMBER: 15
RAD ONC ARIA COURSE START DATE: NORMAL
RAD ONC ARIA COURSE TREATMENT ELAPSED DAYS: 18
RAD ONC ARIA PLAN FRACTIONS TREATED TO DATE: 15
RAD ONC ARIA PLAN ID: NORMAL
RAD ONC ARIA PLAN PRESCRIBED DOSE PER FRACTION: 3 GY
RAD ONC ARIA PLAN PRIMARY REFERENCE POINT: NORMAL
RAD ONC ARIA PLAN TOTAL FRACTIONS PRESCRIBED: 20
RAD ONC ARIA PLAN TOTAL PRESCRIBED DOSE: 6000 CGY
RAD ONC ARIA REFERENCE POINT DOSAGE GIVEN TO DATE: 45 GY
RAD ONC ARIA REFERENCE POINT DOSAGE GIVEN TO DATE: 46.5 GY
RAD ONC ARIA REFERENCE POINT DOSAGE GIVEN TO DATE: 50.24 GY
RAD ONC ARIA REFERENCE POINT DOSAGE GIVEN TO DATE: 52.5 GY
RAD ONC ARIA REFERENCE POINT ID: NORMAL
RAD ONC ARIA REFERENCE POINT SESSION DOSAGE GIVEN: 3 GY
RAD ONC ARIA REFERENCE POINT SESSION DOSAGE GIVEN: 3.1 GY
RAD ONC ARIA REFERENCE POINT SESSION DOSAGE GIVEN: 3.35 GY
RAD ONC ARIA REFERENCE POINT SESSION DOSAGE GIVEN: 3.5 GY

## 2024-02-02 ASSESSMENT — PATIENT HEALTH QUESTIONNAIRE - PHQ9
SUM OF ALL RESPONSES TO PHQ QUESTIONS 1-9: 0
2. FEELING DOWN, DEPRESSED OR HOPELESS: 0
SUM OF ALL RESPONSES TO PHQ QUESTIONS 1-9: 0
1. LITTLE INTEREST OR PLEASURE IN DOING THINGS: 0
SUM OF ALL RESPONSES TO PHQ9 QUESTIONS 1 & 2: 0
SUM OF ALL RESPONSES TO PHQ QUESTIONS 1-9: 0
SUM OF ALL RESPONSES TO PHQ QUESTIONS 1-9: 0

## 2024-02-02 ASSESSMENT — PAIN SCALES - GENERAL: PAINLEVEL_OUTOF10: 0

## 2024-02-02 NOTE — PROGRESS NOTES
[facility] [place]  [facility]    Radiation Oncology Weekly Progress Note  Encounter Date: 02/02/24     Richie Potter III  [id number]  1945     Diagnosis   NCCN high risk prostate adenocarcinoma, clinical T3a (EPE noted on MRI) N0 M0, initial PSA 7.08, [place] 4+3=7 (2 out of total 11/13 biopsies +, + cribriform pattern, + PNI)     AJCC Staging has been reviewed.    Interval History   Mr. Potter is a 78 y.o. male seen today for his weekly on-treatment evaluation    1/19/2024: Doing very well thus far after 5 fractions.  No  or GI complaints.  Having hot flashes.  Informed me that his Orgovyx was over $1000 per month and thus we will switch to Lupron.  Discussed a total of 2 years of ADT.  1/26/2024: Flomax was prescribed last week to help with urination and has significantly improved his urinary symptoms.  Stream and nocturia as well as slight dysuria have all improved.  No dizziness.  No GI complaints or changes to bowel habits.  2/02/2024: Doing well today, only complaint is some flatulence but no diarrhea.  symptoms stable.    Review of Systems:  A complete review of systems was obtained and negative except as described above.    Treatment Details:     Treatment Site Dose/Fx (cGy) #Fx Current Dose (cGy) Total Planned Dose (cGy) Start Date Most Recent Treatment Date   Prostate 300  15/20 4500 6000 with SIB to MRI disease 1/15/24 02/02/24     Concurrent Therapy: Concurrent ADT:      Allergies and Medications     Allergies   Allergen Reactions    Sulfa Antibiotics        Current Outpatient Medications   Medication Instructions    Apoaequorin (PREVAGEN EXTRA STRENGTH PO) 1 tablet, Oral, DAILY    aspirin 81 mg, Oral, DAILY    atenolol (TENORMIN) 25 MG tablet No dose, route, or frequency recorded.    Cholecalciferol (VITAMIN D3) 50 MCG (2000 UT) CAPS Oral, DAILY    fenofibric acid (TRILIPIX) 135 MG CPDR capsule No dose, route, or frequency recorded.    glimepiride (AMARYL) 4 MG tablet No dose,

## 2024-02-05 ENCOUNTER — HOSPITAL ENCOUNTER (OUTPATIENT)
Facility: HOSPITAL | Age: 79
Discharge: HOME | End: 2024-02-08
Attending: RADIOLOGY

## 2024-02-05 LAB
RAD ONC ARIA COURSE FIRST TREATMENT DATE: NORMAL
RAD ONC ARIA COURSE ID: NORMAL
RAD ONC ARIA COURSE INTENT: NORMAL
RAD ONC ARIA COURSE LAST TREATMENT DATE: NORMAL
RAD ONC ARIA COURSE SESSION NUMBER: 16
RAD ONC ARIA COURSE START DATE: NORMAL
RAD ONC ARIA COURSE TREATMENT ELAPSED DAYS: 21
RAD ONC ARIA PLAN FRACTIONS TREATED TO DATE: 16
RAD ONC ARIA PLAN ID: NORMAL
RAD ONC ARIA PLAN PRESCRIBED DOSE PER FRACTION: 3 GY
RAD ONC ARIA PLAN PRIMARY REFERENCE POINT: NORMAL
RAD ONC ARIA PLAN TOTAL FRACTIONS PRESCRIBED: 20
RAD ONC ARIA PLAN TOTAL PRESCRIBED DOSE: 6000 CGY
RAD ONC ARIA REFERENCE POINT DOSAGE GIVEN TO DATE: 48 GY
RAD ONC ARIA REFERENCE POINT DOSAGE GIVEN TO DATE: 49.6 GY
RAD ONC ARIA REFERENCE POINT DOSAGE GIVEN TO DATE: 53.59 GY
RAD ONC ARIA REFERENCE POINT DOSAGE GIVEN TO DATE: 56 GY
RAD ONC ARIA REFERENCE POINT ID: NORMAL
RAD ONC ARIA REFERENCE POINT SESSION DOSAGE GIVEN: 3 GY
RAD ONC ARIA REFERENCE POINT SESSION DOSAGE GIVEN: 3.1 GY
RAD ONC ARIA REFERENCE POINT SESSION DOSAGE GIVEN: 3.35 GY
RAD ONC ARIA REFERENCE POINT SESSION DOSAGE GIVEN: 3.5 GY

## 2024-02-06 ENCOUNTER — HOSPITAL ENCOUNTER (OUTPATIENT)
Facility: HOSPITAL | Age: 79
Discharge: HOME | End: 2024-02-09
Attending: RADIOLOGY

## 2024-02-06 LAB
RAD ONC ARIA COURSE FIRST TREATMENT DATE: NORMAL
RAD ONC ARIA COURSE ID: NORMAL
RAD ONC ARIA COURSE INTENT: NORMAL
RAD ONC ARIA COURSE LAST TREATMENT DATE: NORMAL
RAD ONC ARIA COURSE SESSION NUMBER: 17
RAD ONC ARIA COURSE START DATE: NORMAL
RAD ONC ARIA COURSE TREATMENT ELAPSED DAYS: 22
RAD ONC ARIA PLAN FRACTIONS TREATED TO DATE: 17
RAD ONC ARIA PLAN ID: NORMAL
RAD ONC ARIA PLAN PRESCRIBED DOSE PER FRACTION: 3 GY
RAD ONC ARIA PLAN PRIMARY REFERENCE POINT: NORMAL
RAD ONC ARIA PLAN TOTAL FRACTIONS PRESCRIBED: 20
RAD ONC ARIA PLAN TOTAL PRESCRIBED DOSE: 6000 CGY
RAD ONC ARIA REFERENCE POINT DOSAGE GIVEN TO DATE: 51 GY
RAD ONC ARIA REFERENCE POINT DOSAGE GIVEN TO DATE: 52.7 GY
RAD ONC ARIA REFERENCE POINT DOSAGE GIVEN TO DATE: 56.94 GY
RAD ONC ARIA REFERENCE POINT DOSAGE GIVEN TO DATE: 59.5 GY
RAD ONC ARIA REFERENCE POINT ID: NORMAL
RAD ONC ARIA REFERENCE POINT SESSION DOSAGE GIVEN: 3 GY
RAD ONC ARIA REFERENCE POINT SESSION DOSAGE GIVEN: 3.1 GY
RAD ONC ARIA REFERENCE POINT SESSION DOSAGE GIVEN: 3.35 GY
RAD ONC ARIA REFERENCE POINT SESSION DOSAGE GIVEN: 3.5 GY

## 2024-02-07 ENCOUNTER — HOSPITAL ENCOUNTER (OUTPATIENT)
Facility: HOSPITAL | Age: 79
Discharge: HOME | End: 2024-02-10
Attending: RADIOLOGY

## 2024-02-07 LAB
RAD ONC ARIA COURSE FIRST TREATMENT DATE: NORMAL
RAD ONC ARIA COURSE ID: NORMAL
RAD ONC ARIA COURSE INTENT: NORMAL
RAD ONC ARIA COURSE LAST TREATMENT DATE: NORMAL
RAD ONC ARIA COURSE SESSION NUMBER: 18
RAD ONC ARIA COURSE START DATE: NORMAL
RAD ONC ARIA COURSE TREATMENT ELAPSED DAYS: 23
RAD ONC ARIA PLAN FRACTIONS TREATED TO DATE: 18
RAD ONC ARIA PLAN ID: NORMAL
RAD ONC ARIA PLAN PRESCRIBED DOSE PER FRACTION: 3 GY
RAD ONC ARIA PLAN PRIMARY REFERENCE POINT: NORMAL
RAD ONC ARIA PLAN TOTAL FRACTIONS PRESCRIBED: 20
RAD ONC ARIA PLAN TOTAL PRESCRIBED DOSE: 6000 CGY
RAD ONC ARIA REFERENCE POINT DOSAGE GIVEN TO DATE: 54 GY
RAD ONC ARIA REFERENCE POINT DOSAGE GIVEN TO DATE: 55.8 GY
RAD ONC ARIA REFERENCE POINT DOSAGE GIVEN TO DATE: 60.29 GY
RAD ONC ARIA REFERENCE POINT DOSAGE GIVEN TO DATE: 63 GY
RAD ONC ARIA REFERENCE POINT ID: NORMAL
RAD ONC ARIA REFERENCE POINT SESSION DOSAGE GIVEN: 3 GY
RAD ONC ARIA REFERENCE POINT SESSION DOSAGE GIVEN: 3.1 GY
RAD ONC ARIA REFERENCE POINT SESSION DOSAGE GIVEN: 3.35 GY
RAD ONC ARIA REFERENCE POINT SESSION DOSAGE GIVEN: 3.5 GY

## 2024-02-08 ENCOUNTER — HOSPITAL ENCOUNTER (OUTPATIENT)
Facility: HOSPITAL | Age: 79
Discharge: HOME | End: 2024-02-11
Attending: RADIOLOGY

## 2024-02-08 LAB
RAD ONC ARIA COURSE FIRST TREATMENT DATE: NORMAL
RAD ONC ARIA COURSE ID: NORMAL
RAD ONC ARIA COURSE INTENT: NORMAL
RAD ONC ARIA COURSE LAST TREATMENT DATE: NORMAL
RAD ONC ARIA COURSE SESSION NUMBER: 19
RAD ONC ARIA COURSE START DATE: NORMAL
RAD ONC ARIA COURSE TREATMENT ELAPSED DAYS: 24
RAD ONC ARIA PLAN FRACTIONS TREATED TO DATE: 19
RAD ONC ARIA PLAN ID: NORMAL
RAD ONC ARIA PLAN PRESCRIBED DOSE PER FRACTION: 3 GY
RAD ONC ARIA PLAN PRIMARY REFERENCE POINT: NORMAL
RAD ONC ARIA PLAN TOTAL FRACTIONS PRESCRIBED: 20
RAD ONC ARIA PLAN TOTAL PRESCRIBED DOSE: 6000 CGY
RAD ONC ARIA REFERENCE POINT DOSAGE GIVEN TO DATE: 57 GY
RAD ONC ARIA REFERENCE POINT DOSAGE GIVEN TO DATE: 58.9 GY
RAD ONC ARIA REFERENCE POINT DOSAGE GIVEN TO DATE: 63.64 GY
RAD ONC ARIA REFERENCE POINT DOSAGE GIVEN TO DATE: 66.5 GY
RAD ONC ARIA REFERENCE POINT ID: NORMAL
RAD ONC ARIA REFERENCE POINT SESSION DOSAGE GIVEN: 3 GY
RAD ONC ARIA REFERENCE POINT SESSION DOSAGE GIVEN: 3.1 GY
RAD ONC ARIA REFERENCE POINT SESSION DOSAGE GIVEN: 3.35 GY
RAD ONC ARIA REFERENCE POINT SESSION DOSAGE GIVEN: 3.5 GY

## 2024-02-09 ENCOUNTER — HOSPITAL ENCOUNTER (OUTPATIENT)
Facility: HOSPITAL | Age: 79
Discharge: HOME | End: 2024-02-12
Attending: RADIOLOGY

## 2024-02-09 DIAGNOSIS — C61 PROSTATE CANCER (HCC): Primary | ICD-10-CM

## 2024-02-09 LAB
RAD ONC ARIA COURSE FIRST TREATMENT DATE: NORMAL
RAD ONC ARIA COURSE ID: NORMAL
RAD ONC ARIA COURSE INTENT: NORMAL
RAD ONC ARIA COURSE LAST TREATMENT DATE: NORMAL
RAD ONC ARIA COURSE SESSION NUMBER: 20
RAD ONC ARIA COURSE START DATE: NORMAL
RAD ONC ARIA COURSE TREATMENT ELAPSED DAYS: 25
RAD ONC ARIA PLAN FRACTIONS TREATED TO DATE: 20
RAD ONC ARIA PLAN ID: NORMAL
RAD ONC ARIA PLAN PRESCRIBED DOSE PER FRACTION: 3 GY
RAD ONC ARIA PLAN PRIMARY REFERENCE POINT: NORMAL
RAD ONC ARIA PLAN TOTAL FRACTIONS PRESCRIBED: 20
RAD ONC ARIA PLAN TOTAL PRESCRIBED DOSE: 6000 CGY
RAD ONC ARIA REFERENCE POINT DOSAGE GIVEN TO DATE: 60 GY
RAD ONC ARIA REFERENCE POINT DOSAGE GIVEN TO DATE: 62 GY
RAD ONC ARIA REFERENCE POINT DOSAGE GIVEN TO DATE: 66.99 GY
RAD ONC ARIA REFERENCE POINT DOSAGE GIVEN TO DATE: 70 GY
RAD ONC ARIA REFERENCE POINT ID: NORMAL
RAD ONC ARIA REFERENCE POINT SESSION DOSAGE GIVEN: 3 GY
RAD ONC ARIA REFERENCE POINT SESSION DOSAGE GIVEN: 3.1 GY
RAD ONC ARIA REFERENCE POINT SESSION DOSAGE GIVEN: 3.35 GY
RAD ONC ARIA REFERENCE POINT SESSION DOSAGE GIVEN: 3.5 GY

## 2024-02-09 NOTE — PROGRESS NOTES
[facility] [place]  [facility]    Radiation Oncology Weekly Progress Note  Encounter Date: 02/09/24     Richie Potter III  [id number]  1945     Diagnosis   NCCN high risk prostate adenocarcinoma, clinical T3a (EPE noted on MRI) N0 M0, initial PSA 7.08, [place] 4+3=7 (2 out of total 11/13 biopsies +, + cribriform pattern, + PNI)     AJCC Staging has been reviewed.    Interval History   Mr. Potter is a 78 y.o. male seen today for his weekly on-treatment evaluation    1/19/2024: Doing very well thus far after 5 fractions.  No  or GI complaints.  Having hot flashes.  Informed me that his Orgovyx was over $1000 per month and thus we will switch to Lupron.  Discussed a total of 2 years of ADT.  1/26/2024: Flomax was prescribed last week to help with urination and has significantly improved his urinary symptoms.  Stream and nocturia as well as slight dysuria have all improved.  No dizziness.  No GI complaints or changes to bowel habits.  2/02/2024: Doing well today, only complaint is some flatulence but no diarrhea.  symptoms stable.  2/09/2024: Completed treatment today, not having any issues, stable GI/ symptoms. Plan to see urology in about a week for ADT. Reviewed end of treatment expectations, will have him return in 3 months to see Dr. Iraheta with PSA/T     Review of Systems:  A complete review of systems was obtained and negative except as described above.    Treatment Details:     Treatment Site Dose/Fx (cGy) #Fx Current Dose (cGy) Total Planned Dose (cGy) Start Date Most Recent Treatment Date   Prostate 300  20/20 6000 6000 with SIB to MRI disease 1/15/24 02/09/24     Concurrent Therapy: Concurrent ADT:      Allergies and Medications     Allergies   Allergen Reactions    Sulfa Antibiotics        Current Outpatient Medications   Medication Instructions    Apoaequorin (PREVAGEN EXTRA STRENGTH PO) 1 tablet, Oral, DAILY    aspirin 81 mg, Oral, DAILY    atenolol (TENORMIN) 25 MG tablet No

## 2024-06-14 DIAGNOSIS — C61 MALIGNANT NEOPLASM OF PROSTATE (HCC): Primary | ICD-10-CM

## 2024-06-24 DIAGNOSIS — C61 MALIGNANT NEOPLASM OF PROSTATE (HCC): ICD-10-CM

## 2024-06-26 LAB
PSA SERPL DL<=0.01 NG/ML-MCNC: 0.01 NG/ML (ref 0–4)
TESTOST SERPL-MCNC: <3 NG/DL (ref 264–916)

## 2024-07-02 ENCOUNTER — HOSPITAL ENCOUNTER (OUTPATIENT)
Facility: HOSPITAL | Age: 79
Discharge: HOME | End: 2024-07-05
Attending: RADIOLOGY

## 2024-07-02 VITALS
WEIGHT: 192 LBS | BODY MASS INDEX: 26.01 KG/M2 | SYSTOLIC BLOOD PRESSURE: 110 MMHG | HEIGHT: 72 IN | HEART RATE: 88 BPM | DIASTOLIC BLOOD PRESSURE: 64 MMHG

## 2024-07-02 DIAGNOSIS — C61 PROSTATE CANCER (HCC): Primary | ICD-10-CM

## 2024-07-02 RX ORDER — TIRZEPATIDE 15 MG/.5ML
15 INJECTION, SOLUTION SUBCUTANEOUS WEEKLY
COMMUNITY

## 2024-07-02 RX ORDER — VITAMIN E 268 MG
400 CAPSULE ORAL DAILY
COMMUNITY

## 2024-07-02 ASSESSMENT — PAIN SCALES - GENERAL: PAINLEVEL_OUTOF10: 0

## 2024-07-02 NOTE — PROGRESS NOTES
[facility] [place] at [facility]    Radiation Oncology Follow Up    Richie Potter III  [id number]  1945     Diagnosis / Oncologic History   NCCN high risk prostate adenocarcinoma, clinical T3a (EPE noted on MRI) N0 M0, initial PSA 7.08, [place] 4+3=7 (2 out of total / biopsies +, + cribriform pattern, + PNI) on ADT started 2023 and s/p EBRT to 6000cGy/20fx completed on 24    AJCC Staging has been reviewed.  Interval History   Mr. Potter arrives today for routine follow up 5 months from end of radiation treatments.    He had a PSA on 24 which was 0.008. T <3.     He is no longer on Flomax, due to hypotension. He also since tx completed had a CABG x4v in late May. Has been recovering well.     Has ongoing LUTS, mild. Weak stream at baseline.     IPSS:  Incomplete emptyin  Frequency:  2  Intermittency:  2  Urgency:  2  Weak Stream:  4  Strainin  Nocturia:  2  Total:   15/35  QOL: 2 - mostly satisfied  HELIO: not answered, not sexually active    Review of Systems:  A complete review of systems was obtained and negative except as described above.    Interval Imaging/Labs     Above imaging/lab reports were reviewed.  Allergies and Medications     Allergies   Allergen Reactions    Sulfa Antibiotics        Current Outpatient Medications   Medication Instructions    Apoaequorin (PREVAGEN EXTRA STRENGTH PO) 1 tablet, Oral, DAILY    aspirin 81 mg, Oral, DAILY    atenolol (TENORMIN) 25 MG tablet No dose, route, or frequency recorded.    Cholecalciferol (VITAMIN D3) 50 MCG (2000) CAPS DAILY    fenofibric acid (TRILIPIX) 135 MG CPDR capsule No dose, route, or frequency recorded.    glimepiride (AMARYL) 4 MG tablet No dose, route, or frequency recorded.    Mounjaro 15 mg, SubCUTAneous, WEEKLY    Multiple Vitamins-Minerals (MULTIVITAMIN ADULTS PO) DAILY    Omega-3 Fatty Acids (FISH OIL ULTRA) 1400 MG CAPS Oral, DAILY    ORGOVYX 120 MG TABS No dose, route, or

## 2024-08-02 ENCOUNTER — HOSPITAL ENCOUNTER (OUTPATIENT)
Facility: HOSPITAL | Age: 79
Setting detail: RECURRING SERIES
Discharge: HOME | End: 2024-08-05
Payer: MEDICARE

## 2024-08-02 VITALS
WEIGHT: 202.4 LBS | BODY MASS INDEX: 27.41 KG/M2 | HEIGHT: 72 IN | OXYGEN SATURATION: 99 % | HEART RATE: 83 BPM | RESPIRATION RATE: 18 BRPM

## 2024-08-02 PROCEDURE — 93798 PHYS/QHP OP CAR RHAB W/ECG: CPT

## 2024-08-02 PROCEDURE — 93797 PHYS/QHP OP CAR RHAB WO ECG: CPT

## 2024-08-02 ASSESSMENT — PATIENT HEALTH QUESTIONNAIRE - PHQ9
4. FEELING TIRED OR HAVING LITTLE ENERGY: MORE THAN HALF THE DAYS
SUM OF ALL RESPONSES TO PHQ QUESTIONS 1-9: 3
SUM OF ALL RESPONSES TO PHQ9 QUESTIONS 1 & 2: 0
5. POOR APPETITE OR OVEREATING: NOT AT ALL
SUM OF ALL RESPONSES TO PHQ QUESTIONS 1-9: 3
1. LITTLE INTEREST OR PLEASURE IN DOING THINGS: NOT AT ALL
7. TROUBLE CONCENTRATING ON THINGS, SUCH AS READING THE NEWSPAPER OR WATCHING TELEVISION: NOT AT ALL
9. THOUGHTS THAT YOU WOULD BE BETTER OFF DEAD, OR OF HURTING YOURSELF: NOT AT ALL
SUM OF ALL RESPONSES TO PHQ QUESTIONS 1-9: 3
10. IF YOU CHECKED OFF ANY PROBLEMS, HOW DIFFICULT HAVE THESE PROBLEMS MADE IT FOR YOU TO DO YOUR WORK, TAKE CARE OF THINGS AT HOME, OR GET ALONG WITH OTHER PEOPLE: NOT DIFFICULT AT ALL
6. FEELING BAD ABOUT YOURSELF - OR THAT YOU ARE A FAILURE OR HAVE LET YOURSELF OR YOUR FAMILY DOWN: NOT AT ALL
2. FEELING DOWN, DEPRESSED OR HOPELESS: NOT AT ALL
SUM OF ALL RESPONSES TO PHQ QUESTIONS 1-9: 3
3. TROUBLE FALLING OR STAYING ASLEEP: SEVERAL DAYS
8. MOVING OR SPEAKING SO SLOWLY THAT OTHER PEOPLE COULD HAVE NOTICED. OR THE OPPOSITE, BEING SO FIGETY OR RESTLESS THAT YOU HAVE BEEN MOVING AROUND A LOT MORE THAN USUAL: NOT AT ALL

## 2024-08-02 ASSESSMENT — EXERCISE STRESS TEST
PEAK_RPE: 12
PEAK_BP: 136/60
PEAK_RPE: 12
PEAK_HR: 124
PEAK_METS: 2.2
PEAK_BP: 136/60
PEAK_HR: 124
PEAK_BP: 136/60

## 2024-08-02 ASSESSMENT — LIFESTYLE VARIABLES
ALCOHOL_USE: WEEKLY
ALCOHOL_AMOUNT: 2-3
ALCOHOL_TYPE: BEER

## 2024-08-02 NOTE — CARDIO/PULMONARY
home exercise recommendations. Education manual given to patient and reviewed. Patient will attend cardiac rehab 2-3 times/week which will include both exercise and education sessions.    Patient states that he would like to accomplish the following by completion of the program: Increase strength & stamina with long term plan to go on a vacation to [place] in April 2025. He wants to be able to walk and be able to tour without issues of fatigue or shortness of breath.    Intake Start Time: 1000  Intake End Time: 1145    Neetu Bell, RN, RN

## 2024-08-12 ENCOUNTER — HOSPITAL ENCOUNTER (OUTPATIENT)
Facility: HOSPITAL | Age: 79
Setting detail: RECURRING SERIES
Discharge: HOME | End: 2024-08-15
Payer: MEDICARE

## 2024-08-12 VITALS — WEIGHT: 199 LBS | BODY MASS INDEX: 26.99 KG/M2

## 2024-08-12 PROCEDURE — 93798 PHYS/QHP OP CAR RHAB W/ECG: CPT

## 2024-08-12 ASSESSMENT — EXERCISE STRESS TEST
PEAK_HR: 125
PEAK_RPE: 12
PEAK_METS: 2.2

## 2024-08-14 ENCOUNTER — APPOINTMENT (OUTPATIENT)
Facility: HOSPITAL | Age: 79
End: 2024-08-14
Payer: MEDICARE

## 2024-10-15 ENCOUNTER — TELEPHONE (OUTPATIENT)
Age: 79
End: 2024-10-15

## 2024-10-15 NOTE — TELEPHONE ENCOUNTER
Spoke with patient regarding his new patient appointment request for Dr. Ribeiro. I informed him that Dr. Ribeiro will be retiring in April 2025 and is no longer accepting new patients. I let him know that Dr. Burr has openings starting 11/25 and Dr. Roa has openings starting 11/18. Patient states he is currently at another appointment and will return a call to [facility] once he gets home.

## 2024-11-18 ENCOUNTER — OFFICE VISIT (OUTPATIENT)
Age: 79
End: 2024-11-18
Payer: MEDICARE

## 2024-11-18 VITALS
HEART RATE: 85 BPM | DIASTOLIC BLOOD PRESSURE: 76 MMHG | RESPIRATION RATE: 18 BRPM | TEMPERATURE: 97.3 F | OXYGEN SATURATION: 98 % | BODY MASS INDEX: 25.44 KG/M2 | WEIGHT: 187.8 LBS | SYSTOLIC BLOOD PRESSURE: 124 MMHG | HEIGHT: 72 IN

## 2024-11-18 DIAGNOSIS — E11.65 TYPE 2 DIABETES MELLITUS WITH HYPERGLYCEMIA, WITHOUT LONG-TERM CURRENT USE OF INSULIN (HCC): Primary | ICD-10-CM

## 2024-11-18 DIAGNOSIS — D63.8 ANEMIA IN OTHER CHRONIC DISEASES CLASSIFIED ELSEWHERE: ICD-10-CM

## 2024-11-18 PROCEDURE — 1123F ACP DISCUSS/DSCN MKR DOCD: CPT | Performed by: STUDENT IN AN ORGANIZED HEALTH CARE EDUCATION/TRAINING PROGRAM

## 2024-11-18 PROCEDURE — 99204 OFFICE O/P NEW MOD 45 MIN: CPT | Performed by: STUDENT IN AN ORGANIZED HEALTH CARE EDUCATION/TRAINING PROGRAM

## 2024-11-18 PROCEDURE — 1036F TOBACCO NON-USER: CPT | Performed by: STUDENT IN AN ORGANIZED HEALTH CARE EDUCATION/TRAINING PROGRAM

## 2024-11-18 PROCEDURE — G8484 FLU IMMUNIZE NO ADMIN: HCPCS | Performed by: STUDENT IN AN ORGANIZED HEALTH CARE EDUCATION/TRAINING PROGRAM

## 2024-11-18 PROCEDURE — 1159F MED LIST DOCD IN RCRD: CPT | Performed by: STUDENT IN AN ORGANIZED HEALTH CARE EDUCATION/TRAINING PROGRAM

## 2024-11-18 PROCEDURE — 1126F AMNT PAIN NOTED NONE PRSNT: CPT | Performed by: STUDENT IN AN ORGANIZED HEALTH CARE EDUCATION/TRAINING PROGRAM

## 2024-11-18 PROCEDURE — G8427 DOCREV CUR MEDS BY ELIG CLIN: HCPCS | Performed by: STUDENT IN AN ORGANIZED HEALTH CARE EDUCATION/TRAINING PROGRAM

## 2024-11-18 PROCEDURE — G8419 CALC BMI OUT NRM PARAM NOF/U: HCPCS | Performed by: STUDENT IN AN ORGANIZED HEALTH CARE EDUCATION/TRAINING PROGRAM

## 2024-11-18 RX ORDER — TAMSULOSIN HYDROCHLORIDE 0.4 MG/1
0.4 CAPSULE ORAL DAILY
COMMUNITY

## 2024-11-18 SDOH — ECONOMIC STABILITY: INCOME INSECURITY: HOW HARD IS IT FOR YOU TO PAY FOR THE VERY BASICS LIKE FOOD, HOUSING, MEDICAL CARE, AND HEATING?: NOT HARD AT ALL

## 2024-11-18 SDOH — ECONOMIC STABILITY: FOOD INSECURITY: WITHIN THE PAST 12 MONTHS, THE FOOD YOU BOUGHT JUST DIDN'T LAST AND YOU DIDN'T HAVE MONEY TO GET MORE.: NEVER TRUE

## 2024-11-18 SDOH — ECONOMIC STABILITY: FOOD INSECURITY: WITHIN THE PAST 12 MONTHS, YOU WORRIED THAT YOUR FOOD WOULD RUN OUT BEFORE YOU GOT MONEY TO BUY MORE.: NEVER TRUE

## 2024-11-18 NOTE — PROGRESS NOTES
LUMA Pal have reviewed all needed documentation in preparation for visit. Verified patient by name and date of birth  Chief Complaint   Patient presents with    Establish Care    Pre-op Exam     Eye surgery        Vitals:    11/18/24 1510   BP: 124/76   Site: Left Upper Arm   Position: Sitting   Cuff Size: Medium Adult   Pulse: 85   Resp: 18   Temp: 97.3 °F (36.3 °C)   TempSrc: Temporal   SpO2: 98%   Weight: 85.2 kg (187 lb 12.8 oz)   Height: 1.829 m (6')       Health Maintenance Due   Topic Date Due    Lipids  Never done    Hepatitis C screen  Never done    DTaP/Tdap/Td vaccine (1 - Tdap) Never done    Shingles vaccine (1 of 2) Never done    Pneumococcal 65+ years Vaccine (1 of 1 - PCV) Never done    Respiratory Syncytial Virus (RSV) Pregnant or age 60 yrs+ (1 - 1-dose 75+ series) Never done    Annual Wellness Visit (Medicare)  Never done    Flu vaccine (1) 08/01/2024    COVID-19 Vaccine (1 - 2023-24 season) Never done     \"Have you been to the ER, urgent care clinic since your last visit?  Hospitalized since your last visit?\"    NO    “Have you seen or consulted any other health care providers outside of [facility] since your last visit?”    yes  Hematologist -Dr. Nathan Vargas , Cardiologist- Dr. Beltran , Urologist- Dr. Tim ,Surgeon-Dr. Hope Olivier, PCP- Dr. Malena Martinez          Click Here for Release of Records Request         ASHANTI Pal

## 2024-11-19 DIAGNOSIS — E11.65 TYPE 2 DIABETES MELLITUS WITH HYPERGLYCEMIA, WITHOUT LONG-TERM CURRENT USE OF INSULIN (HCC): ICD-10-CM

## 2024-11-19 DIAGNOSIS — D63.8 ANEMIA IN OTHER CHRONIC DISEASES CLASSIFIED ELSEWHERE: ICD-10-CM

## 2024-11-19 ASSESSMENT — ENCOUNTER SYMPTOMS
ABDOMINAL PAIN: 0
CHEST TIGHTNESS: 0
NAUSEA: 0
BACK PAIN: 0
SHORTNESS OF BREATH: 0
CONSTIPATION: 0
VOMITING: 0
DIARRHEA: 0

## 2024-11-19 NOTE — PROGRESS NOTES
Richie Potter III is a 79 y.o. male who presents to [facility] care. Patient was previously followed by outside provider.     Richie Potter III main concerns today are as follows:  Chief Complaint   Patient presents with    [facility] Care    Pre-op Exam     Eye surgery      Patient presents today to [facility]. No active complaints however he shares he had several medical event occur in 2024.     He had prostate cancer diagnose din January of 2024 now s/p radiation and on leuoprolide  This summer he had evaluation for persistent elevated carotid pressures and was found to have mutlivessel disease of heart and is now s/p 4 vessel CABG  He then developed a persistent anemia requiring multiple transfusions he had upper and lower scope that were unrevealing and had video endoscopy that did show gastric tumor that has been resected. His hemoglobin has now  steadily improved.     He follows with: urology, GI, heme/onc, cardio and endocrine.     He has pending eye surgery.     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 Start Date End Date Provider    Apoaequorin (PREVAGEN EXTRA STRENGTH PO)   --  --  Patrick Hernandez MD    aspirin 81 MG chewable tablet   --  --  Patrick Hernandez MD    Cholecalciferol (VITAMIN D3) 50 MCG (2000 UT) CAPS   --  --  Patrick Hernandez MD    fenofibric acid (TRILIPIX) 135 MG CPDR capsule   09/06/23  --  Patrick Hernandez MD    glimepiride (AMARYL) 4 MG tablet   09/11/23  --  Patrick Hernandez MD    Leuprolide Acetate, 6 Month, (LUPRON DEPOT, 6-MONTH, IM)   --  --  Provider, Historical, MD    MOUNJARO 15 MG/0.5ML SOPN SC injection   --  --  Provider, Historical, MD    Multiple

## 2024-11-20 LAB
ALBUMIN SERPL-MCNC: 4.1 G/DL (ref 3.5–5)
ALBUMIN/GLOB SERPL: 1.2 (ref 1.1–2.2)
ALP SERPL-CCNC: 74 U/L (ref 45–117)
ALT SERPL-CCNC: 26 U/L (ref 12–78)
ANION GAP SERPL CALC-SCNC: 7 MMOL/L (ref 2–12)
AST SERPL-CCNC: 24 U/L (ref 15–37)
BASOPHILS # BLD: 0 K/UL (ref 0–0.1)
BASOPHILS NFR BLD: 0 % (ref 0–1)
BILIRUB SERPL-MCNC: 0.4 MG/DL (ref 0.2–1)
BUN SERPL-MCNC: 26 MG/DL (ref 6–20)
BUN/CREAT SERPL: 30 (ref 12–20)
CALCIUM SERPL-MCNC: 10 MG/DL (ref 8.5–10.1)
CHLORIDE SERPL-SCNC: 106 MMOL/L (ref 97–108)
CHOLEST SERPL-MCNC: 143 MG/DL
CO2 SERPL-SCNC: 29 MMOL/L (ref 21–32)
CREAT SERPL-MCNC: 0.88 MG/DL (ref 0.7–1.3)
CREAT UR-MCNC: 72.3 MG/DL
DIFFERENTIAL METHOD BLD: ABNORMAL
EOSINOPHIL # BLD: 0.2 K/UL (ref 0–0.4)
EOSINOPHIL NFR BLD: 3 % (ref 0–7)
ERYTHROCYTE [DISTWIDTH] IN BLOOD BY AUTOMATED COUNT: 17.4 % (ref 11.5–14.5)
EST. AVERAGE GLUCOSE BLD GHB EST-MCNC: 140 MG/DL
FERRITIN SERPL-MCNC: 20 NG/ML (ref 26–388)
GLOBULIN SER CALC-MCNC: 3.3 G/DL (ref 2–4)
GLUCOSE SERPL-MCNC: 160 MG/DL (ref 65–100)
HBA1C MFR BLD: 6.5 % (ref 4–5.6)
HCT VFR BLD AUTO: 40.1 % (ref 36.6–50.3)
HDLC SERPL-MCNC: 38 MG/DL
HDLC SERPL: 3.8 (ref 0–5)
HGB BLD-MCNC: 12.5 G/DL (ref 12.1–17)
IMM GRANULOCYTES # BLD AUTO: 0 K/UL (ref 0–0.04)
IMM GRANULOCYTES NFR BLD AUTO: 0 % (ref 0–0.5)
IRON SATN MFR SERPL: 14 % (ref 20–50)
IRON SERPL-MCNC: 70 UG/DL (ref 35–150)
LDLC SERPL CALC-MCNC: 46.8 MG/DL (ref 0–100)
LYMPHOCYTES # BLD: 0.9 K/UL (ref 0.8–3.5)
LYMPHOCYTES NFR BLD: 17 % (ref 12–49)
MCH RBC QN AUTO: 29.1 PG (ref 26–34)
MCHC RBC AUTO-ENTMCNC: 31.2 G/DL (ref 30–36.5)
MCV RBC AUTO: 93.3 FL (ref 80–99)
MICROALBUMIN UR-MCNC: 0.81 MG/DL
MICROALBUMIN/CREAT UR-RTO: 11 MG/G (ref 0–30)
MONOCYTES # BLD: 0.3 K/UL (ref 0–1)
MONOCYTES NFR BLD: 6 % (ref 5–13)
NEUTS SEG # BLD: 3.9 K/UL (ref 1.8–8)
NEUTS SEG NFR BLD: 74 % (ref 32–75)
NRBC # BLD: 0 K/UL (ref 0–0.01)
NRBC BLD-RTO: 0 PER 100 WBC
PLATELET # BLD AUTO: 199 K/UL (ref 150–400)
PMV BLD AUTO: 11.8 FL (ref 8.9–12.9)
POTASSIUM SERPL-SCNC: 4.8 MMOL/L (ref 3.5–5.1)
PROT SERPL-MCNC: 7.4 G/DL (ref 6.4–8.2)
RBC # BLD AUTO: 4.3 M/UL (ref 4.1–5.7)
SODIUM SERPL-SCNC: 142 MMOL/L (ref 136–145)
SPECIMEN HOLD: NORMAL
TIBC SERPL-MCNC: 484 UG/DL (ref 250–450)
TRIGL SERPL-MCNC: 291 MG/DL
VLDLC SERPL CALC-MCNC: 58.2 MG/DL
WBC # BLD AUTO: 5.3 K/UL (ref 4.1–11.1)

## 2024-12-12 ENCOUNTER — HOSPITAL ENCOUNTER (OUTPATIENT)
Facility: HOSPITAL | Age: 79
Setting detail: RECURRING SERIES
Discharge: HOME | End: 2024-12-15
Payer: COMMERCIAL

## 2024-12-12 VITALS — BODY MASS INDEX: 25.81 KG/M2 | HEIGHT: 72 IN | WEIGHT: 190.6 LBS

## 2024-12-12 PROCEDURE — 93797 PHYS/QHP OP CAR RHAB WO ECG: CPT

## 2024-12-12 PROCEDURE — 93798 PHYS/QHP OP CAR RHAB W/ECG: CPT

## 2024-12-12 ASSESSMENT — PATIENT HEALTH QUESTIONNAIRE - PHQ9
SUM OF ALL RESPONSES TO PHQ QUESTIONS 1-9: 1
4. FEELING TIRED OR HAVING LITTLE ENERGY: NOT AT ALL
10. IF YOU CHECKED OFF ANY PROBLEMS, HOW DIFFICULT HAVE THESE PROBLEMS MADE IT FOR YOU TO DO YOUR WORK, TAKE CARE OF THINGS AT HOME, OR GET ALONG WITH OTHER PEOPLE: NOT DIFFICULT AT ALL
SUM OF ALL RESPONSES TO PHQ QUESTIONS 1-9: 1
7. TROUBLE CONCENTRATING ON THINGS, SUCH AS READING THE NEWSPAPER OR WATCHING TELEVISION: NOT AT ALL
SUM OF ALL RESPONSES TO PHQ QUESTIONS 1-9: 1
3. TROUBLE FALLING OR STAYING ASLEEP: SEVERAL DAYS
SUM OF ALL RESPONSES TO PHQ QUESTIONS 1-9: 1
8. MOVING OR SPEAKING SO SLOWLY THAT OTHER PEOPLE COULD HAVE NOTICED. OR THE OPPOSITE, BEING SO FIGETY OR RESTLESS THAT YOU HAVE BEEN MOVING AROUND A LOT MORE THAN USUAL: NOT AT ALL
5. POOR APPETITE OR OVEREATING: NOT AT ALL
9. THOUGHTS THAT YOU WOULD BE BETTER OFF DEAD, OR OF HURTING YOURSELF: NOT AT ALL
2. FEELING DOWN, DEPRESSED OR HOPELESS: NOT AT ALL
1. LITTLE INTEREST OR PLEASURE IN DOING THINGS: NOT AT ALL
6. FEELING BAD ABOUT YOURSELF - OR THAT YOU ARE A FAILURE OR HAVE LET YOURSELF OR YOUR FAMILY DOWN: NOT AT ALL
SUM OF ALL RESPONSES TO PHQ9 QUESTIONS 1 & 2: 0

## 2024-12-12 ASSESSMENT — EXERCISE STRESS TEST
PEAK_RPE: 10
PEAK_METS: 2
PEAK_HR: 103

## 2024-12-12 NOTE — CARDIO/PULMONARY
INTAKE APPOINTMENT NOTE  2024    NAME: Richie Potter III : 1945 AGE: 79 y.o.  GENDER: male    CARDIAC REHAB ADMITTING DIAGNOSIS: Presence of aortocoronary bypass graft [Z95.1]    REFERRING PHYSICIAN: Elliot Aguirre MD    MEDICAL HX:  Past Medical History:   Diagnosis Date    CAD (coronary artery disease)     S/P CABG 24, h/o stent LAD     Diabetes (HCC)     DVT (deep venous thrombosis) (HCC)     Factor 5 Leiden mutation, heterozygous (HCC)     High cholesterol     History of blood transfusion 2024    2 units    Hx of blood clots     Factor V Leiden, h/o DVT and PE    Hypertension     PE (pulmonary thromboembolism) (HCC)     Prostate cancer (HCC)        LABS:       Lab Results   Component Value Date/Time    CHOL 143 2024 02:23 PM    HDL 38 2024 02:23 PM    LDL 46.8 2024 02:23 PM    VLDL 58.2 2024 02:23 PM         ANTHROPOMETRICS:      Ht Readings from Last 1 Encounters:   24 1.829 m (6')      Wt Readings from Last 1 Encounters:   24 86.5 kg (190 lb 9.6 oz)        WAIST: 40       VISIT SUMMARY:    Richie Potter III 79 y.o. presented to Cardiac Rehab for program orientation and 6 minute exercise tolerance test today with a primary diagnosis of Presence of aortocoronary bypass graft [Z95.1]. EF is  (unavailable) Cardiac risk factors include dyslipidemia, diabetes mellitus. Patient is not a smoker. Richie Potter III lives in [place] with his wife. Patient was evaluated for depression using the PHQ-9 assessment tool with a result of 1 which is considered mild. Patient withdrew from the cardiac rehab program in August due to gastric cancer diagnosis but is now eager to complete the program. Patient denied chest pain or SOB during 6 minute exercise tolerance test and was in SR/ST few pvc's. Patient exercised for a final MET level of 2. Exercise prescription developed using exercise tolerance results and patient stated goals, to be supplemented with

## 2024-12-16 ENCOUNTER — TELEMEDICINE (OUTPATIENT)
Age: 79
End: 2024-12-16
Payer: MEDICARE

## 2024-12-16 DIAGNOSIS — Z00.00 ENCOUNTER FOR ANNUAL WELLNESS EXAM IN MEDICARE PATIENT: Primary | ICD-10-CM

## 2024-12-16 PROCEDURE — G0439 PPPS, SUBSEQ VISIT: HCPCS | Performed by: STUDENT IN AN ORGANIZED HEALTH CARE EDUCATION/TRAINING PROGRAM

## 2024-12-16 PROCEDURE — G8484 FLU IMMUNIZE NO ADMIN: HCPCS | Performed by: STUDENT IN AN ORGANIZED HEALTH CARE EDUCATION/TRAINING PROGRAM

## 2024-12-16 PROCEDURE — 1123F ACP DISCUSS/DSCN MKR DOCD: CPT | Performed by: STUDENT IN AN ORGANIZED HEALTH CARE EDUCATION/TRAINING PROGRAM

## 2024-12-16 PROCEDURE — 1159F MED LIST DOCD IN RCRD: CPT | Performed by: STUDENT IN AN ORGANIZED HEALTH CARE EDUCATION/TRAINING PROGRAM

## 2024-12-16 NOTE — PROGRESS NOTES
I have reviewed all needed documentation in preparation for visit. Verified patient by name and date of birth  Chief Complaint   Patient presents with    Discuss Labs    Follow-up       There were no vitals filed for this visit.    Health Maintenance Due   Topic Date Due    Pneumococcal 65+ years Vaccine (1 of 2 - PCV) Never done    Hepatitis C screen  Never done    DTaP/Tdap/Td vaccine (1 - Tdap) Never done    Shingles vaccine (1 of 2) Never done    Respiratory Syncytial Virus (RSV) Pregnant or age 60 yrs+ (1 - 1-dose 75+ series) Never done    Annual Wellness Visit (Medicare)  Never done    Flu vaccine (1) 08/01/2024    COVID-19 Vaccine (1 - 2023-24 season) Never done     \"Have you been to the ER, urgent care clinic since your last visit?  Hospitalized since your last visit?\"    NO    “Have you seen or consulted any other health care providers outside of [facility] since your last visit?”    NO            Click Here for Release of Records Request         ASHANTI Pal

## 2024-12-17 ENCOUNTER — HOSPITAL ENCOUNTER (OUTPATIENT)
Facility: HOSPITAL | Age: 79
Setting detail: RECURRING SERIES
Discharge: HOME | End: 2024-12-20
Payer: COMMERCIAL

## 2024-12-17 VITALS — BODY MASS INDEX: 25.9 KG/M2 | WEIGHT: 191 LBS

## 2024-12-17 PROCEDURE — 93798 PHYS/QHP OP CAR RHAB W/ECG: CPT

## 2024-12-17 ASSESSMENT — EXERCISE STRESS TEST
PEAK_METS: 2
PEAK_HR: 120
PEAK_RPE: 11

## 2024-12-17 NOTE — PROGRESS NOTES
This is the Subsequent Medicare Annual Wellness Exam, performed 12 months or more after the Initial AWV or the last Subsequent AWV  Health Maintenance Due   Topic Date Due    Pneumococcal 65+ years Vaccine (1 of 2 - PCV) Never done    Hepatitis C screen  Never done    DTaP/Tdap/Td vaccine (1 - Tdap) Never done    Shingles vaccine (1 of 2) Never done    Respiratory Syncytial Virus (RSV) Pregnant or age 60 yrs+ (1 - 1-dose 75+ series) Never done    Annual Wellness Visit (Medicare)  Never done    Flu vaccine (1) 08/01/2024    COVID-19 Vaccine (1 - 2023-24 season) Never done     Depression Risk Factor Screening:          No data to display                Abuse Screen  Patient is not abused    Fall Risk  Failed to redirect to the Timeline version of the REVFS SmartLink.    Alcohol Risk Factor Screening:    reports current alcohol use of about 3.0 standard drinks of alcohol per week.     Pt is not taking Opioids   Current Outpatient Medications   Medication Sig Dispense Refill    tamsulosin (FLOMAX) 0.4 MG capsule Take 1 capsule by mouth daily      Leuprolide Acetate, 6 Month, (LUPRON DEPOT, 6-MONTH, IM) Inject into the muscle      Pembrolizumab (KEYTRUDA IV) Infuse intravenously Every three weeks      MOUNJARO 15 MG/0.5ML SOPN SC injection Inject 0.5 mLs into the skin once a week      warfarin (COUMADIN) 2.5 MG tablet       rosuvastatin (CRESTOR) 20 MG tablet       glimepiride (AMARYL) 4 MG tablet 2 times daily      XIGDUO XR 5-1000 MG TB24 in the morning and at bedtime      fenofibric acid (TRILIPIX) 135 MG CPDR capsule       aspirin 81 MG chewable tablet Take 1 tablet by mouth daily      Omega-3 Fatty Acids (FISH OIL ULTRA) 1400 MG CAPS Take 1,000 mg by mouth 2 times daily      Cholecalciferol (VITAMIN D3) 50 MCG (2000 UT) CAPS Take by mouth in the morning, at noon, and at bedtime      Multiple Vitamins-Minerals (MULTIVITAMIN ADULTS PO) Take by mouth daily      Apoaequorin (PREVAGEN EXTRA STRENGTH PO) Take 1 tablet by

## 2024-12-19 ENCOUNTER — HOSPITAL ENCOUNTER (OUTPATIENT)
Facility: HOSPITAL | Age: 79
Setting detail: RECURRING SERIES
Discharge: HOME | End: 2024-12-22
Payer: COMMERCIAL

## 2024-12-19 VITALS — WEIGHT: 191 LBS | BODY MASS INDEX: 25.9 KG/M2

## 2024-12-19 PROCEDURE — 93798 PHYS/QHP OP CAR RHAB W/ECG: CPT

## 2024-12-19 ASSESSMENT — EXERCISE STRESS TEST
PEAK_METS: 2
PEAK_HR: 118
PEAK_RPE: 11

## 2024-12-23 ENCOUNTER — HOSPITAL ENCOUNTER (OUTPATIENT)
Facility: HOSPITAL | Age: 79
Setting detail: RECURRING SERIES
Discharge: HOME | End: 2024-12-26
Payer: COMMERCIAL

## 2024-12-23 VITALS — BODY MASS INDEX: 25.9 KG/M2 | WEIGHT: 191 LBS

## 2024-12-23 PROCEDURE — 93798 PHYS/QHP OP CAR RHAB W/ECG: CPT

## 2024-12-23 ASSESSMENT — EXERCISE STRESS TEST
PEAK_HR: 122
PEAK_RPE: 11
PEAK_METS: 2.8

## 2024-12-26 ENCOUNTER — HOSPITAL ENCOUNTER (OUTPATIENT)
Facility: HOSPITAL | Age: 79
Setting detail: RECURRING SERIES
Discharge: HOME | End: 2024-12-29
Payer: MEDICARE

## 2024-12-26 VITALS — BODY MASS INDEX: 25.9 KG/M2 | WEIGHT: 191 LBS

## 2024-12-26 PROCEDURE — 93798 PHYS/QHP OP CAR RHAB W/ECG: CPT

## 2024-12-26 ASSESSMENT — EXERCISE STRESS TEST
PEAK_METS: 3.4
PEAK_RPE: 11
PEAK_HR: 125

## 2024-12-30 ENCOUNTER — HOSPITAL ENCOUNTER (OUTPATIENT)
Facility: HOSPITAL | Age: 79
Setting detail: RECURRING SERIES
Discharge: HOME | End: 2025-01-02
Payer: MEDICARE

## 2024-12-30 VITALS — BODY MASS INDEX: 25.9 KG/M2 | WEIGHT: 191 LBS

## 2024-12-30 PROCEDURE — 93798 PHYS/QHP OP CAR RHAB W/ECG: CPT

## 2024-12-30 ASSESSMENT — EXERCISE STRESS TEST
PEAK_RPE: 11
PEAK_HR: 124
PEAK_METS: 3.4

## 2025-01-02 ENCOUNTER — HOSPITAL ENCOUNTER (OUTPATIENT)
Facility: HOSPITAL | Age: 80
Setting detail: RECURRING SERIES
Discharge: HOME | End: 2025-01-05
Payer: MEDICARE

## 2025-01-02 VITALS — BODY MASS INDEX: 25.77 KG/M2 | WEIGHT: 190 LBS

## 2025-01-02 PROCEDURE — 93798 PHYS/QHP OP CAR RHAB W/ECG: CPT

## 2025-01-02 ASSESSMENT — EXERCISE STRESS TEST
PEAK_HR: 124
PEAK_RPE: 12
PEAK_METS: 3.4

## 2025-01-09 ENCOUNTER — APPOINTMENT (OUTPATIENT)
Facility: HOSPITAL | Age: 80
End: 2025-01-09
Payer: MEDICARE

## 2025-01-14 ENCOUNTER — HOSPITAL ENCOUNTER (OUTPATIENT)
Facility: HOSPITAL | Age: 80
Setting detail: RECURRING SERIES
Discharge: HOME | End: 2025-01-17
Payer: MEDICARE

## 2025-01-14 VITALS — WEIGHT: 186 LBS | BODY MASS INDEX: 25.23 KG/M2

## 2025-01-14 PROCEDURE — 93798 PHYS/QHP OP CAR RHAB W/ECG: CPT

## 2025-01-14 ASSESSMENT — EXERCISE STRESS TEST
PEAK_HR: 131
PEAK_METS: 3.4
PEAK_RPE: 13

## 2025-01-16 ENCOUNTER — HOSPITAL ENCOUNTER (OUTPATIENT)
Facility: HOSPITAL | Age: 80
Setting detail: RECURRING SERIES
Discharge: HOME | End: 2025-01-19
Payer: MEDICARE

## 2025-01-16 VITALS — WEIGHT: 191 LBS | BODY MASS INDEX: 25.9 KG/M2

## 2025-01-16 PROCEDURE — 93798 PHYS/QHP OP CAR RHAB W/ECG: CPT

## 2025-01-16 ASSESSMENT — EXERCISE STRESS TEST
PEAK_RPE: 13
PEAK_HR: 131
PEAK_METS: 3.4

## 2025-01-21 ENCOUNTER — HOSPITAL ENCOUNTER (OUTPATIENT)
Facility: HOSPITAL | Age: 80
Setting detail: RECURRING SERIES
Discharge: HOME | End: 2025-01-24
Payer: MEDICARE

## 2025-01-21 VITALS — WEIGHT: 192 LBS | BODY MASS INDEX: 26.04 KG/M2

## 2025-01-21 PROCEDURE — 93798 PHYS/QHP OP CAR RHAB W/ECG: CPT

## 2025-01-21 ASSESSMENT — EXERCISE STRESS TEST
PEAK_METS: 3.4
PEAK_HR: 116
PEAK_RPE: 13

## 2025-01-23 ENCOUNTER — HOSPITAL ENCOUNTER (OUTPATIENT)
Facility: HOSPITAL | Age: 80
Setting detail: RECURRING SERIES
Discharge: HOME | End: 2025-01-26
Payer: MEDICARE

## 2025-01-23 ENCOUNTER — HOSPITAL ENCOUNTER (OUTPATIENT)
Facility: HOSPITAL | Age: 80
Setting detail: SPECIMEN
Discharge: HOME | End: 2025-01-26

## 2025-01-23 VITALS — WEIGHT: 191 LBS | BODY MASS INDEX: 25.9 KG/M2

## 2025-01-23 DIAGNOSIS — C61 PROSTATE CANCER (HCC): ICD-10-CM

## 2025-01-23 PROCEDURE — 93798 PHYS/QHP OP CAR RHAB W/ECG: CPT

## 2025-01-23 ASSESSMENT — EXERCISE STRESS TEST
PEAK_RPE: 13
PEAK_METS: 3.4
PEAK_HR: 129

## 2025-01-25 LAB
PSA SERPL DL<=0.01 NG/ML-MCNC: <0.006 NG/ML (ref 0–4)
TESTOST SERPL-MCNC: <3 NG/DL (ref 264–916)

## 2025-01-28 ENCOUNTER — HOSPITAL ENCOUNTER (OUTPATIENT)
Facility: HOSPITAL | Age: 80
Setting detail: RECURRING SERIES
Discharge: HOME | End: 2025-01-31
Payer: MEDICARE

## 2025-01-28 VITALS — BODY MASS INDEX: 25.9 KG/M2 | WEIGHT: 191 LBS

## 2025-01-28 PROCEDURE — 93798 PHYS/QHP OP CAR RHAB W/ECG: CPT

## 2025-01-28 ASSESSMENT — EXERCISE STRESS TEST
PEAK_METS: 3
PEAK_RPE: 13
PEAK_HR: 123

## 2025-01-29 ENCOUNTER — HOSPITAL ENCOUNTER (OUTPATIENT)
Facility: HOSPITAL | Age: 80
Discharge: HOME | End: 2025-02-01
Attending: RADIOLOGY

## 2025-01-29 VITALS
HEIGHT: 72 IN | WEIGHT: 185 LBS | SYSTOLIC BLOOD PRESSURE: 112 MMHG | BODY MASS INDEX: 25.06 KG/M2 | HEART RATE: 72 BPM | DIASTOLIC BLOOD PRESSURE: 61 MMHG

## 2025-01-29 DIAGNOSIS — C61 PROSTATE CANCER (HCC): Primary | ICD-10-CM

## 2025-01-29 ASSESSMENT — PAIN SCALES - GENERAL: PAINLEVEL_OUTOF10: 0

## 2025-01-29 NOTE — PROGRESS NOTES
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  Interval History   Mr. Potter arrives today for routine follow up 12 months from end of radiation treatments.    He received his last 6-month Lupron injection in August 2024 with Dr. Tim.  Tolerating well.    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 Vargas.     He had a PSA drawn prior to today for surveillance on 1/23/2025 which was less than 0.006 with a testosterone total less than 3.    He has some ongoing mild lower urinary tract symptoms.  Taking Flomax once daily with good effect.  No significant GI complaints today.  Ongoing fatigue, however he reports he is working out regularly at the [facility] and also doing cardiac rehab.    IPSS Questionnaire (AUA-7):  Over the past month…    1)  How often have you had a sensation of not emptying your bladder completely after you finish urinating?  3 - About half the time   2)  How often have you had to urinate again less than two hours after you finished urinating? 1 - Less than 1 time in 5   3)  How often have you found you stopped and started again several times when you urinated?  3 - About half the time   4) How difficult have you found it to postpone urination?  3 - About half the time   5) How often have you had a weak urinary stream?  4 - More than half the time   6) How often have you had to push or strain to begin urination?  1 - Less than 1 time in 5   7) How many times did you most typically get up to urinate from the

## 2025-01-30 ENCOUNTER — HOSPITAL ENCOUNTER (OUTPATIENT)
Facility: HOSPITAL | Age: 80
Setting detail: RECURRING SERIES
End: 2025-01-30
Payer: MEDICARE

## 2025-01-30 VITALS — WEIGHT: 191 LBS | BODY MASS INDEX: 25.9 KG/M2

## 2025-01-30 PROCEDURE — 93798 PHYS/QHP OP CAR RHAB W/ECG: CPT

## 2025-01-30 ASSESSMENT — EXERCISE STRESS TEST
PEAK_HR: 116
PEAK_METS: 3
PEAK_RPE: 13

## 2025-02-04 ENCOUNTER — HOSPITAL ENCOUNTER (OUTPATIENT)
Facility: HOSPITAL | Age: 80
Setting detail: RECURRING SERIES
Discharge: HOME | End: 2025-02-07
Payer: MEDICARE

## 2025-02-04 VITALS — BODY MASS INDEX: 25.9 KG/M2 | WEIGHT: 191 LBS

## 2025-02-04 PROCEDURE — 93798 PHYS/QHP OP CAR RHAB W/ECG: CPT

## 2025-02-04 ASSESSMENT — EXERCISE STRESS TEST
PEAK_RPE: 13
PEAK_METS: 3.5
PEAK_HR: 122

## 2025-02-05 NOTE — PROGRESS NOTES
Cardiac Rehab Nutrition Assessment- 1:1 Evaluation  2025      NAME: Richie Potter III : 1945 AGE: 79 y.o.  GENDER: male  CARDIAC REHAB ADMITTING DIAGNOSIS: Presence of aortocoronary bypass graft [Z95.1]    PROBLEM LIST:  Patient Active Problem List   Diagnosis    Prostate cancer (HCC)   Type 2 diabetes mellitus with hyperglycemia E11.65 ; Type 2 diabetes mellitus with cardiac complication E11.59 ; Coronary artery disease involving native coronary artery of native heart without angina pectoris I25.10 ; Long term (current) use of oral hypoglycemic drugs Z79.84 ; Therapeutic drug monitoring Z51.81 ; Mixed hyperlipidemia E78.2 ; Essential (primary) hypertension I10 ; Mild anemia D64.9 and Leukopenia, unspecified type D72.819       LABS:   Hemoglobin A1C   Date Value Ref Range Status   2024 6.5 (H) 4.0 - 5.6 % Final     Comment:     (NOTE)  HbA1C Interpretive Ranges  <5.7              Normal  5.7 - 6.4         Consider Prediabetes  >6.5              Consider Diabetes       Lab Results   Component Value Date     2024    K 4.8 2024     2024    CO2 29 2024    BUN 26 (H) 2024    CREATININE 0.88 2024    GLUCOSE 160 (H) 2024    CALCIUM 10.0 2024    BILITOT 0.4 2024    ALKPHOS 74 2024    AST 24 2024    ALT 26 2024    LABGLOM 89 2024    GLOB 3.3 2024         Lab Results   Component Value Date    CHOL 143 2024    TRIG 291 (H) 2024    HDL 38 2024    LDL 46.8 2024    VLDL 58.2 2024    CHOLHDLRATIO 3.8 2024         MEDICATIONS/SUPPLEMENTS:   Scheduled Meds:  Continuous Infusions:  PRN Meds:.  Prior to Admission medications    Medication Sig Start Date End Date Taking? Authorizing Provider   tamsulosin (FLOMAX) 0.4 MG capsule Take 1 capsule by mouth daily    Provider, MD Patrick   Leuprolide Acetate, 6 Month, (LUPRON DEPOT, 6-MONTH, IM) Inject into the muscle    Provider,

## 2025-02-06 ENCOUNTER — HOSPITAL ENCOUNTER (OUTPATIENT)
Facility: HOSPITAL | Age: 80
Setting detail: RECURRING SERIES
Discharge: HOME | End: 2025-02-09
Payer: MEDICARE

## 2025-02-06 VITALS — BODY MASS INDEX: 25.9 KG/M2 | WEIGHT: 191 LBS

## 2025-02-06 PROCEDURE — 93798 PHYS/QHP OP CAR RHAB W/ECG: CPT

## 2025-02-06 PROCEDURE — 93797 PHYS/QHP OP CAR RHAB WO ECG: CPT

## 2025-02-06 ASSESSMENT — EXERCISE STRESS TEST
PEAK_HR: 125
PEAK_RPE: 13
PEAK_METS: 3.5

## 2025-02-11 ENCOUNTER — APPOINTMENT (OUTPATIENT)
Facility: HOSPITAL | Age: 80
End: 2025-02-11
Payer: MEDICARE

## 2025-02-18 ENCOUNTER — HOSPITAL ENCOUNTER (OUTPATIENT)
Facility: HOSPITAL | Age: 80
Setting detail: RECURRING SERIES
Discharge: HOME | End: 2025-02-21
Payer: MEDICARE

## 2025-02-18 VITALS — BODY MASS INDEX: 26.04 KG/M2 | WEIGHT: 192 LBS

## 2025-02-18 PROCEDURE — 93798 PHYS/QHP OP CAR RHAB W/ECG: CPT

## 2025-02-18 ASSESSMENT — EXERCISE STRESS TEST
PEAK_HR: 130
PEAK_RPE: 13
PEAK_METS: 3.5

## 2025-02-21 ENCOUNTER — HOSPITAL ENCOUNTER (OUTPATIENT)
Facility: HOSPITAL | Age: 80
Setting detail: RECURRING SERIES
Discharge: HOME | End: 2025-02-24
Payer: MEDICARE

## 2025-02-21 VITALS — WEIGHT: 192 LBS | BODY MASS INDEX: 26.04 KG/M2

## 2025-02-21 PROCEDURE — 93798 PHYS/QHP OP CAR RHAB W/ECG: CPT

## 2025-02-21 ASSESSMENT — EXERCISE STRESS TEST
PEAK_HR: 136
PEAK_RPE: 13
PEAK_METS: 3.5

## 2025-02-24 ENCOUNTER — TELEPHONE (OUTPATIENT)
Age: 80
End: 2025-02-24

## 2025-02-24 RX ORDER — FENOFIBRIC ACID 135 MG/1
135 CAPSULE, DELAYED RELEASE ORAL DAILY
Qty: 90 CAPSULE | Refills: 0 | Status: SHIPPED | OUTPATIENT
Start: 2025-02-24

## 2025-02-24 NOTE — TELEPHONE ENCOUNTER
Refill request received from [facility] for   Requested Prescriptions     Pending Prescriptions Disp Refills    fenofibric acid (TRILIPIX) 135 MG CPDR capsule 30 capsule      Last office visit: 12/16/2024   Next office visit: Visit date not found     Routed to Dr Anjana Roa for review.         Noah Will Cma

## 2025-02-24 NOTE — TELEPHONE ENCOUNTER
Pt is looking for the following medications to be refilled - pt was taking it prior to his new PCP.    fenofibric acid (TRILIPIX) 135 MG CPDR capsule [[id number]]     [facility]

## 2025-02-25 ENCOUNTER — HOSPITAL ENCOUNTER (OUTPATIENT)
Facility: HOSPITAL | Age: 80
Setting detail: RECURRING SERIES
Discharge: HOME | End: 2025-02-28
Payer: MEDICARE

## 2025-02-25 VITALS — WEIGHT: 192 LBS | BODY MASS INDEX: 26.04 KG/M2

## 2025-02-25 PROCEDURE — 93798 PHYS/QHP OP CAR RHAB W/ECG: CPT

## 2025-02-25 ASSESSMENT — EXERCISE STRESS TEST
PEAK_METS: 3.5
PEAK_HR: 124
PEAK_RPE: 13

## 2025-02-27 ENCOUNTER — HOSPITAL ENCOUNTER (OUTPATIENT)
Facility: HOSPITAL | Age: 80
Setting detail: RECURRING SERIES
End: 2025-02-27
Payer: MEDICARE

## 2025-02-27 VITALS — BODY MASS INDEX: 26.04 KG/M2 | WEIGHT: 192 LBS

## 2025-02-27 PROCEDURE — 93798 PHYS/QHP OP CAR RHAB W/ECG: CPT

## 2025-02-27 ASSESSMENT — EXERCISE STRESS TEST
PEAK_METS: 2.5
PEAK_HR: 127
PEAK_RPE: 13

## 2025-03-04 ENCOUNTER — HOSPITAL ENCOUNTER (OUTPATIENT)
Facility: HOSPITAL | Age: 80
Setting detail: RECURRING SERIES
Discharge: HOME | End: 2025-03-07
Payer: MEDICARE

## 2025-03-04 VITALS — WEIGHT: 192 LBS | BODY MASS INDEX: 26.04 KG/M2

## 2025-03-04 PROCEDURE — 93798 PHYS/QHP OP CAR RHAB W/ECG: CPT

## 2025-03-04 ASSESSMENT — EXERCISE STRESS TEST
PEAK_METS: 3.5
PEAK_HR: 121
PEAK_RPE: 13

## 2025-03-06 ENCOUNTER — APPOINTMENT (OUTPATIENT)
Facility: HOSPITAL | Age: 80
End: 2025-03-06
Payer: MEDICARE

## 2025-03-18 ENCOUNTER — HOSPITAL ENCOUNTER (OUTPATIENT)
Facility: HOSPITAL | Age: 80
Setting detail: RECURRING SERIES
Discharge: HOME | End: 2025-03-21
Payer: MEDICARE

## 2025-03-18 VITALS — WEIGHT: 192 LBS | BODY MASS INDEX: 26.04 KG/M2

## 2025-03-18 PROCEDURE — 93798 PHYS/QHP OP CAR RHAB W/ECG: CPT

## 2025-03-18 ASSESSMENT — EXERCISE STRESS TEST
PEAK_METS: 3.5
PEAK_RPE: 13
PEAK_HR: 126

## 2025-03-20 ENCOUNTER — HOSPITAL ENCOUNTER (OUTPATIENT)
Facility: HOSPITAL | Age: 80
Setting detail: RECURRING SERIES
Discharge: HOME | End: 2025-03-23
Payer: MEDICARE

## 2025-03-20 VITALS — BODY MASS INDEX: 26.04 KG/M2 | WEIGHT: 192 LBS

## 2025-03-20 PROCEDURE — 93798 PHYS/QHP OP CAR RHAB W/ECG: CPT

## 2025-03-20 ASSESSMENT — EXERCISE STRESS TEST
PEAK_HR: 124
PEAK_RPE: 13
PEAK_METS: 3.5

## 2025-03-31 ENCOUNTER — TELEPHONE (OUTPATIENT)
Age: 80
End: 2025-03-31

## 2025-03-31 RX ORDER — FENOFIBRIC ACID 135 MG/1
135 CAPSULE, DELAYED RELEASE ORAL DAILY
Qty: 15 CAPSULE | Refills: 0 | Status: SHIPPED | OUTPATIENT
Start: 2025-03-31

## 2025-03-31 RX ORDER — FENOFIBRIC ACID 135 MG/1
135 CAPSULE, DELAYED RELEASE ORAL DAILY
Qty: 90 CAPSULE | Refills: 1 | Status: SHIPPED | OUTPATIENT
Start: 2025-03-31 | End: 2025-03-31

## 2025-03-31 NOTE — TELEPHONE ENCOUNTER
Pt states that he has run out of the following medication      fenofibric acid (TRILIPIX) 135 MG CPDR capsule     He gets a 90 day supply from [facility] but needs some pills to hold him over until his script comes in the mail. He said about 1 to 2 weeks worth.

## 2025-04-01 ENCOUNTER — HOSPITAL ENCOUNTER (OUTPATIENT)
Facility: HOSPITAL | Age: 80
Setting detail: RECURRING SERIES
Discharge: HOME | End: 2025-04-04
Payer: MEDICARE

## 2025-04-01 VITALS — BODY MASS INDEX: 26.04 KG/M2 | WEIGHT: 192 LBS

## 2025-04-01 PROCEDURE — 93798 PHYS/QHP OP CAR RHAB W/ECG: CPT

## 2025-04-01 ASSESSMENT — EXERCISE STRESS TEST
PEAK_RPE: 13
PEAK_METS: 3.6
PEAK_HR: 125

## 2025-04-03 ENCOUNTER — HOSPITAL ENCOUNTER (OUTPATIENT)
Facility: HOSPITAL | Age: 80
Setting detail: RECURRING SERIES
Discharge: HOME | End: 2025-04-06
Payer: MEDICARE

## 2025-04-03 VITALS — WEIGHT: 192 LBS | BODY MASS INDEX: 26.04 KG/M2

## 2025-04-03 PROCEDURE — 93798 PHYS/QHP OP CAR RHAB W/ECG: CPT

## 2025-04-03 ASSESSMENT — EXERCISE STRESS TEST
PEAK_METS: 3.6
PEAK_RPE: 13

## 2025-04-08 ENCOUNTER — HOSPITAL ENCOUNTER (OUTPATIENT)
Facility: HOSPITAL | Age: 80
Setting detail: RECURRING SERIES
Discharge: HOME | End: 2025-04-11
Payer: MEDICARE

## 2025-04-08 VITALS — WEIGHT: 192 LBS | BODY MASS INDEX: 26.04 KG/M2

## 2025-04-08 PROCEDURE — 93798 PHYS/QHP OP CAR RHAB W/ECG: CPT

## 2025-04-08 ASSESSMENT — EXERCISE STRESS TEST
PEAK_HR: 129
PEAK_METS: 4.2
PEAK_RPE: 13

## 2025-04-10 ENCOUNTER — HOSPITAL ENCOUNTER (OUTPATIENT)
Facility: HOSPITAL | Age: 80
Setting detail: RECURRING SERIES
Discharge: HOME | End: 2025-04-13
Payer: MEDICARE

## 2025-04-10 VITALS — WEIGHT: 192 LBS | BODY MASS INDEX: 26.04 KG/M2

## 2025-04-10 PROCEDURE — 93798 PHYS/QHP OP CAR RHAB W/ECG: CPT

## 2025-04-10 ASSESSMENT — EXERCISE STRESS TEST
PEAK_METS: 4.2
PEAK_RPE: 13
PEAK_HR: 129

## 2025-04-14 ENCOUNTER — HOSPITAL ENCOUNTER (OUTPATIENT)
Facility: HOSPITAL | Age: 80
Setting detail: RECURRING SERIES
Discharge: HOME | End: 2025-04-17
Payer: MEDICARE

## 2025-04-14 VITALS — WEIGHT: 192 LBS | BODY MASS INDEX: 26.04 KG/M2

## 2025-04-14 PROCEDURE — 93798 PHYS/QHP OP CAR RHAB W/ECG: CPT

## 2025-04-14 ASSESSMENT — EXERCISE STRESS TEST
PEAK_METS: 4.2
PEAK_HR: 124
PEAK_RPE: 13

## 2025-04-16 ENCOUNTER — APPOINTMENT (OUTPATIENT)
Facility: HOSPITAL | Age: 80
End: 2025-04-16
Payer: MEDICARE

## 2025-04-22 ENCOUNTER — HOSPITAL ENCOUNTER (OUTPATIENT)
Facility: HOSPITAL | Age: 80
Setting detail: RECURRING SERIES
Discharge: HOME | End: 2025-04-25
Payer: MEDICARE

## 2025-04-22 VITALS — BODY MASS INDEX: 26.04 KG/M2 | WEIGHT: 192 LBS

## 2025-04-22 PROCEDURE — 93798 PHYS/QHP OP CAR RHAB W/ECG: CPT

## 2025-04-22 ASSESSMENT — EXERCISE STRESS TEST
PEAK_METS: 4.2
PEAK_HR: 129
PEAK_RPE: 13

## 2025-04-24 ENCOUNTER — HOSPITAL ENCOUNTER (OUTPATIENT)
Facility: HOSPITAL | Age: 80
Setting detail: RECURRING SERIES
Discharge: HOME | End: 2025-04-27
Payer: MEDICARE

## 2025-04-24 VITALS — BODY MASS INDEX: 26.04 KG/M2 | WEIGHT: 192 LBS

## 2025-04-24 PROCEDURE — 93798 PHYS/QHP OP CAR RHAB W/ECG: CPT

## 2025-04-24 ASSESSMENT — EXERCISE STRESS TEST
PEAK_HR: 138
PEAK_BP: 119/62
PEAK_METS: 3.6
PEAK_RPE: 12

## 2025-04-29 ENCOUNTER — HOSPITAL ENCOUNTER (OUTPATIENT)
Facility: HOSPITAL | Age: 80
Setting detail: RECURRING SERIES
Discharge: HOME | End: 2025-05-02
Payer: MEDICARE

## 2025-04-29 VITALS — BODY MASS INDEX: 26.04 KG/M2 | WEIGHT: 192 LBS

## 2025-04-29 PROCEDURE — 93798 PHYS/QHP OP CAR RHAB W/ECG: CPT

## 2025-04-29 ASSESSMENT — EXERCISE STRESS TEST
PEAK_RPE: 12
PEAK_HR: 134
PEAK_METS: 3.5

## 2025-05-01 ENCOUNTER — HOSPITAL ENCOUNTER (OUTPATIENT)
Facility: HOSPITAL | Age: 80
Setting detail: RECURRING SERIES
Discharge: HOME | End: 2025-05-04
Payer: MEDICARE

## 2025-05-01 VITALS — WEIGHT: 192 LBS | BODY MASS INDEX: 26.04 KG/M2

## 2025-05-01 PROCEDURE — 93798 PHYS/QHP OP CAR RHAB W/ECG: CPT

## 2025-05-01 ASSESSMENT — PATIENT HEALTH QUESTIONNAIRE - PHQ9
7. TROUBLE CONCENTRATING ON THINGS, SUCH AS READING THE NEWSPAPER OR WATCHING TELEVISION: NOT AT ALL
6. FEELING BAD ABOUT YOURSELF - OR THAT YOU ARE A FAILURE OR HAVE LET YOURSELF OR YOUR FAMILY DOWN: NOT AT ALL
SUM OF ALL RESPONSES TO PHQ QUESTIONS 1-9: 1
8. MOVING OR SPEAKING SO SLOWLY THAT OTHER PEOPLE COULD HAVE NOTICED. OR THE OPPOSITE, BEING SO FIGETY OR RESTLESS THAT YOU HAVE BEEN MOVING AROUND A LOT MORE THAN USUAL: NOT AT ALL
9. THOUGHTS THAT YOU WOULD BE BETTER OFF DEAD, OR OF HURTING YOURSELF: NOT AT ALL
1. LITTLE INTEREST OR PLEASURE IN DOING THINGS: NOT AT ALL
10. IF YOU CHECKED OFF ANY PROBLEMS, HOW DIFFICULT HAVE THESE PROBLEMS MADE IT FOR YOU TO DO YOUR WORK, TAKE CARE OF THINGS AT HOME, OR GET ALONG WITH OTHER PEOPLE: NOT DIFFICULT AT ALL
5. POOR APPETITE OR OVEREATING: NOT AT ALL
3. TROUBLE FALLING OR STAYING ASLEEP: SEVERAL DAYS
SUM OF ALL RESPONSES TO PHQ QUESTIONS 1-9: 1
2. FEELING DOWN, DEPRESSED OR HOPELESS: NOT AT ALL
4. FEELING TIRED OR HAVING LITTLE ENERGY: NOT AT ALL

## 2025-05-01 ASSESSMENT — EXERCISE STRESS TEST
PEAK_HR: 129
PEAK_METS: 3.5
PEAK_RPE: 12

## 2025-05-06 ENCOUNTER — HOSPITAL ENCOUNTER (OUTPATIENT)
Facility: HOSPITAL | Age: 80
Setting detail: RECURRING SERIES
Discharge: HOME | End: 2025-05-09
Payer: MEDICARE

## 2025-05-06 VITALS — BODY MASS INDEX: 26.04 KG/M2 | WEIGHT: 192 LBS

## 2025-05-06 PROCEDURE — 93798 PHYS/QHP OP CAR RHAB W/ECG: CPT

## 2025-05-06 ASSESSMENT — EXERCISE STRESS TEST
PEAK_METS: 3.6
PEAK_RPE: 12
PEAK_HR: 118

## 2025-05-06 NOTE — CARDIO/PULMONARY
DISCHARGE SUMMARY NOTE  2025      NAME: Richie Potter III : 1945 AGE: 80 y.o.  GENDER: male    CARDIAC REHAB ADMITTING DIAGNOSIS: Presence of aortocoronary bypass graft [Z95.1]    REFERRING PHYSICIAN: Elliot Aguirre MD    MEDICAL HX:  Past Medical History:   Diagnosis Date    CAD (coronary artery disease)     S/P CABG 24, h/o stent LAD     Diabetes (HCC)     DVT (deep venous thrombosis) (HCC)     Factor 5 Leiden mutation, heterozygous     High cholesterol     History of blood transfusion 2024    2 units    Hx of blood clots     Factor V Leiden, h/o DVT and PE    Hypertension     Melanoma (HCC)     small bowel - s/p small bowel resection, on Keytruda    PE (pulmonary thromboembolism) (HCC)     Prostate cancer (HCC)        LABS:     No results found for: \"HBA1C\", \"[id number]\"  Lab Results   Component Value Date/Time    CHOL 143 2024 02:23 PM    HDL 38 2024 02:23 PM    LDL 46.8 2024 02:23 PM    VLDL 58.2 2024 02:23 PM         ANTHROPOMETRICS:      Ht Readings from Last 1 Encounters:   25 1.829 m (6')      Wt Readings from Last 1 Encounters:   25 87.1 kg (192 lb)        WAIST:           PROGRAM SUMMARY:    Richie Potter III completed 34/34 sessions in the Cardiac Rehab program. He will continue exercising 3 x week and focus on diet and nutrition at home. H e attended 1 classes and is aware of his/her cardiac risk factors and cardiac medications. MET level increase from 2.0 to 3.5.     Questions addressed. Discharge plans discussed. Richie Potter III verbalized understanding.      ALEXIS ROLON RN

## 2025-06-30 RX ORDER — FENOFIBRIC ACID 135 MG/1
CAPSULE, DELAYED RELEASE ORAL DAILY
Qty: 90 CAPSULE | Refills: 0 | Status: SHIPPED | OUTPATIENT
Start: 2025-06-30

## 2025-07-14 ENCOUNTER — TELEPHONE (OUTPATIENT)
Age: 80
End: 2025-07-14

## 2025-07-14 NOTE — TELEPHONE ENCOUNTER
Pt is requesting Paxlovid due to [place] going out [place] this [place]. Pt is wanting a call back with the verdict of the decision - I did explain that Dr. SANDOVAL is not I the office this week and that another provider may or may not prescribe this medication upon their digression.

## 2025-07-15 NOTE — TELEPHONE ENCOUNTER
Verified pt identifiers x 2  Spoke with pt he voiced his concerns of having it as a preventive while out of the country due to his immunity .

## 2025-09-05 ENCOUNTER — TELEPHONE (OUTPATIENT)
Age: 80
End: 2025-09-05

## 2025-09-05 RX ORDER — COVID-19 VACCINE, MRNA 0.04 MG/.418ML
1 INJECTION, SUSPENSION INTRAMUSCULAR ONCE
Qty: 0.3 ML | Refills: 0 | Status: SHIPPED | OUTPATIENT
Start: 2025-09-05 | End: 2025-09-05